# Patient Record
Sex: MALE | Race: BLACK OR AFRICAN AMERICAN | Employment: OTHER | URBAN - METROPOLITAN AREA
[De-identification: names, ages, dates, MRNs, and addresses within clinical notes are randomized per-mention and may not be internally consistent; named-entity substitution may affect disease eponyms.]

---

## 2017-08-02 PROBLEM — R60.0 LOCALIZED EDEMA: Status: ACTIVE | Noted: 2017-08-02

## 2017-10-12 PROBLEM — R60.0 LOCALIZED EDEMA: Status: RESOLVED | Noted: 2017-08-02 | Resolved: 2017-10-12

## 2018-04-19 ENCOUNTER — HOSPITAL ENCOUNTER (OUTPATIENT)
Dept: ULTRASOUND IMAGING | Age: 63
Discharge: HOME | End: 2018-04-19
Attending: INTERNAL MEDICINE
Payer: MEDICARE

## 2018-04-19 DIAGNOSIS — R09.89 RIGHT CAROTID BRUIT: ICD-10-CM

## 2018-04-19 PROCEDURE — 93880 EXTRACRANIAL BILAT STUDY: CPT

## 2018-05-07 ENCOUNTER — HOSPITAL ENCOUNTER (OUTPATIENT)
Dept: CT IMAGING | Age: 63
Discharge: HOME | End: 2018-05-07
Attending: INTERNAL MEDICINE
Payer: MEDICARE

## 2018-05-07 DIAGNOSIS — Z91.89 CARDIOVASCULAR RISK FACTOR: ICD-10-CM

## 2018-05-07 PROCEDURE — 75571 CT HRT W/O DYE W/CA TEST: CPT

## 2018-05-07 NOTE — PROGRESS NOTES
He does have some plaque in his coronaries-score is 154-above 100 is significant. Does he report any SOB or chest pain or exertional fatigue-if so I will schedule chemical stress test?  See what he says?

## 2018-05-08 NOTE — PROGRESS NOTES
Spoke to patient, detailed message was relayed and understanding expressed. Patient denies SOB, chest pain or exertional fatigue.

## 2018-09-26 ENCOUNTER — APPOINTMENT (OUTPATIENT)
Dept: PHYSICAL THERAPY | Age: 63
End: 2018-09-26

## 2018-10-02 ENCOUNTER — HOSPITAL ENCOUNTER (OUTPATIENT)
Dept: PHYSICAL THERAPY | Age: 63
Discharge: HOME | End: 2018-10-02
Payer: MEDICARE

## 2018-10-02 PROCEDURE — G8990 OTHER PT/OT CURRENT STATUS: HCPCS

## 2018-10-02 PROCEDURE — 97140 MANUAL THERAPY 1/> REGIONS: CPT

## 2018-10-02 PROCEDURE — 97166 OT EVAL MOD COMPLEX 45 MIN: CPT

## 2018-10-02 PROCEDURE — G8991 OTHER PT/OT GOAL STATUS: HCPCS

## 2018-10-02 NOTE — PROGRESS NOTES
Ambulatory/Rehab Services H2 Model Falls Risk Assessment    Risk Factor Pts. ·   Confusion/Disorientation/Impulsivity  []    4 ·   Symptomatic Depression  []   2 ·   Altered Elimination  []   1 ·   Dizziness/Vertigo  []   1 ·   Gender (Male)  [x]   1 ·   Any administered antiepileptics (anticonvulsants):  []   2 ·   Any administered benzodiazepines:  []   1 ·   Visual Impairment (specify):  []   1 ·   Portable Oxygen Use  []   1 ·   Orthostatic ? BP  []   1 ·   History of Recent Falls (within 3 mos.)  [x]   5     Ability to Rise from Chair (choose one) Pts. ·   Ability to rise in a single movement  []   0 ·   Pushes up, successful in one attempt  [x]   1 ·   Multiple attempts, but successful  []   3 ·   Unable to rise without assistance  []   4   Total: (5 or greater = High Risk) 7     Falls Prevention Plan:   []                Physical Limitations to Exercise (specify):   [x]                Mobility Assistance Device (type): NBQC   []                Exercise/Equipment Adaptation (specify):    ©2010 [facility] [address] [place] [address] Patent #8,[id number].  Federal Law prohibits the replication, distribution or use without written permission from [facility] [employer]
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
## 2018-10-16 ENCOUNTER — HOSPITAL ENCOUNTER (OUTPATIENT)
Dept: PHYSICAL THERAPY | Age: 63
Discharge: HOME | End: 2018-10-16
Payer: MEDICARE

## 2018-10-16 PROCEDURE — 97140 MANUAL THERAPY 1/> REGIONS: CPT
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
## 2018-10-22 PROBLEM — I89.0 LYMPHEDEMA OF LIMB: Status: ACTIVE | Noted: 2018-10-22

## 2018-10-23 ENCOUNTER — HOSPITAL ENCOUNTER (OUTPATIENT)
Dept: PHYSICAL THERAPY | Age: 63
Discharge: HOME | End: 2018-10-23
Payer: MEDICARE

## 2018-10-23 PROCEDURE — 97140 MANUAL THERAPY 1/> REGIONS: CPT
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
## 2019-02-21 PROBLEM — I89.0 LYMPHEDEMA OF LIMB: Status: RESOLVED | Noted: 2018-10-22 | Resolved: 2019-02-21

## 2021-05-06 PROBLEM — N18.31 STAGE 3A CHRONIC KIDNEY DISEASE (HCC): Status: ACTIVE | Noted: 2021-05-06

## 2021-05-06 PROBLEM — E78.2 MIXED HYPERLIPIDEMIA: Status: ACTIVE | Noted: 2021-05-06

## 2022-02-24 PROBLEM — R33.9 URINARY RETENTION: Status: ACTIVE | Noted: 2022-02-24

## 2022-03-18 PROBLEM — R33.9 URINARY RETENTION: Status: ACTIVE | Noted: 2022-02-24

## 2022-03-19 PROBLEM — E78.2 MIXED HYPERLIPIDEMIA: Status: ACTIVE | Noted: 2021-05-06

## 2022-03-19 PROBLEM — N18.31 STAGE 3A CHRONIC KIDNEY DISEASE (HCC): Status: ACTIVE | Noted: 2021-05-06

## 2022-06-15 ENCOUNTER — OFFICE VISIT (OUTPATIENT)
Dept: UROLOGY | Age: 67
End: 2022-06-15
Payer: MEDICARE

## 2022-06-15 DIAGNOSIS — R33.9 URINARY RETENTION: Primary | ICD-10-CM

## 2022-06-15 DIAGNOSIS — N40.1 BPH WITH OBSTRUCTION/LOWER URINARY TRACT SYMPTOMS: ICD-10-CM

## 2022-06-15 DIAGNOSIS — N13.8 BPH WITH OBSTRUCTION/LOWER URINARY TRACT SYMPTOMS: ICD-10-CM

## 2022-06-15 LAB
BILIRUBIN, URINE, POC: NEGATIVE
BLOOD URINE, POC: NORMAL
GLUCOSE URINE, POC: NEGATIVE
KETONES, URINE, POC: NEGATIVE
LEUKOCYTE ESTERASE, URINE, POC: NEGATIVE
NITRITE, URINE, POC: NEGATIVE
PH, URINE, POC: 5.5 (ref 4.6–8)
PROTEIN,URINE, POC: 30
PVR, POC: 346 CC
SPECIFIC GRAVITY, URINE, POC: 1.02 (ref 1–1.03)
URINALYSIS CLARITY, POC: NORMAL
URINALYSIS COLOR, POC: NORMAL
UROBILINOGEN, POC: NORMAL

## 2022-06-15 PROCEDURE — G8417 CALC BMI ABV UP PARAM F/U: HCPCS | Performed by: NURSE PRACTITIONER

## 2022-06-15 PROCEDURE — 3017F COLORECTAL CA SCREEN DOC REV: CPT | Performed by: NURSE PRACTITIONER

## 2022-06-15 PROCEDURE — 51798 US URINE CAPACITY MEASURE: CPT | Performed by: NURSE PRACTITIONER

## 2022-06-15 PROCEDURE — 1036F TOBACCO NON-USER: CPT | Performed by: NURSE PRACTITIONER

## 2022-06-15 PROCEDURE — 81003 URINALYSIS AUTO W/O SCOPE: CPT | Performed by: NURSE PRACTITIONER

## 2022-06-15 PROCEDURE — G8427 DOCREV CUR MEDS BY ELIG CLIN: HCPCS | Performed by: NURSE PRACTITIONER

## 2022-06-15 PROCEDURE — 99214 OFFICE O/P EST MOD 30 MIN: CPT | Performed by: NURSE PRACTITIONER

## 2022-06-15 PROCEDURE — 1123F ACP DISCUSS/DSCN MKR DOCD: CPT | Performed by: NURSE PRACTITIONER

## 2022-06-15 RX ORDER — TAMSULOSIN HYDROCHLORIDE 0.4 MG/1
0.4 CAPSULE ORAL NIGHTLY
Qty: 90 CAPSULE | Refills: 3 | Status: SHIPPED | OUTPATIENT
Start: 2022-06-15

## 2022-06-15 ASSESSMENT — ENCOUNTER SYMPTOMS
VOMITING: 0
BACK PAIN: 0
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
## 2022-06-20 ENCOUNTER — OFFICE VISIT (OUTPATIENT)
Dept: INTERNAL MEDICINE CLINIC | Facility: CLINIC | Age: 67
End: 2022-06-20
Payer: MEDICARE

## 2022-06-20 VITALS
BODY MASS INDEX: 34.02 KG/M2 | HEART RATE: 71 BPM | WEIGHT: 243 LBS | DIASTOLIC BLOOD PRESSURE: 94 MMHG | SYSTOLIC BLOOD PRESSURE: 143 MMHG | TEMPERATURE: 98.4 F | HEIGHT: 71 IN | RESPIRATION RATE: 16 BRPM | OXYGEN SATURATION: 98 %

## 2022-06-20 DIAGNOSIS — R60.0 BILATERAL LEG EDEMA: ICD-10-CM

## 2022-06-20 DIAGNOSIS — E78.2 MIXED HYPERLIPIDEMIA: ICD-10-CM

## 2022-06-20 DIAGNOSIS — N18.31 STAGE 3A CHRONIC KIDNEY DISEASE (HCC): ICD-10-CM

## 2022-06-20 DIAGNOSIS — I89.0 LYMPHEDEMA OF RIGHT LOWER EXTREMITY: ICD-10-CM

## 2022-06-20 DIAGNOSIS — I10 ESSENTIAL HYPERTENSION: Primary | ICD-10-CM

## 2022-06-20 LAB
ALBUMIN SERPL-MCNC: 3.7 G/DL (ref 3.2–4.6)
ALBUMIN/GLOB SERPL: 0.8 {RATIO} (ref 1.2–3.5)
ALP SERPL-CCNC: 94 U/L (ref 50–136)
ALT SERPL-CCNC: 23 U/L (ref 12–65)
ANION GAP SERPL CALC-SCNC: 5 MMOL/L (ref 7–16)
AST SERPL-CCNC: 18 U/L (ref 15–37)
BASOPHILS # BLD: 0 K/UL (ref 0–0.2)
BASOPHILS NFR BLD: 0 % (ref 0–2)
BILIRUB SERPL-MCNC: 0.6 MG/DL (ref 0.2–1.1)
BUN SERPL-MCNC: 15 MG/DL (ref 8–23)
CALCIUM SERPL-MCNC: 9.1 MG/DL (ref 8.3–10.4)
CHLORIDE SERPL-SCNC: 106 MMOL/L (ref 98–107)
CHOLEST SERPL-MCNC: 176 MG/DL
CO2 SERPL-SCNC: 28 MMOL/L (ref 21–32)
CREAT SERPL-MCNC: 1.1 MG/DL (ref 0.8–1.5)
DIFFERENTIAL METHOD BLD: NORMAL
EOSINOPHIL # BLD: 0.1 K/UL (ref 0–0.8)
EOSINOPHIL NFR BLD: 2 % (ref 0.5–7.8)
ERYTHROCYTE [DISTWIDTH] IN BLOOD BY AUTOMATED COUNT: 13.6 % (ref 11.9–14.6)
GLOBULIN SER CALC-MCNC: 4.5 G/DL (ref 2.3–3.5)
GLUCOSE SERPL-MCNC: 79 MG/DL (ref 65–100)
HCT VFR BLD AUTO: 41.7 % (ref 41.1–50.3)
HDLC SERPL-MCNC: 38 MG/DL (ref 40–60)
HDLC SERPL: 4.6 {RATIO}
HGB BLD-MCNC: 13.9 G/DL (ref 13.6–17.2)
IMM GRANULOCYTES # BLD AUTO: 0 K/UL (ref 0–0.5)
IMM GRANULOCYTES NFR BLD AUTO: 0 % (ref 0–5)
LDLC SERPL CALC-MCNC: 113.2 MG/DL
LYMPHOCYTES # BLD: 2.6 K/UL (ref 0.5–4.6)
LYMPHOCYTES NFR BLD: 38 % (ref 13–44)
MCH RBC QN AUTO: 30.4 PG (ref 26.1–32.9)
MCHC RBC AUTO-ENTMCNC: 33.3 G/DL (ref 31.4–35)
MCV RBC AUTO: 91.2 FL (ref 79.6–97.8)
MONOCYTES # BLD: 0.5 K/UL (ref 0.1–1.3)
MONOCYTES NFR BLD: 7 % (ref 4–12)
NEUTS SEG # BLD: 3.6 K/UL (ref 1.7–8.2)
NEUTS SEG NFR BLD: 53 % (ref 43–78)
NRBC # BLD: 0 K/UL (ref 0–0.2)
PLATELET # BLD AUTO: 178 K/UL (ref 150–450)
PMV BLD AUTO: 10.7 FL (ref 9.4–12.3)
POTASSIUM SERPL-SCNC: 4.3 MMOL/L (ref 3.5–5.1)
PROT SERPL-MCNC: 8.2 G/DL (ref 6.3–8.2)
RBC # BLD AUTO: 4.57 M/UL (ref 4.23–5.6)
SODIUM SERPL-SCNC: 139 MMOL/L (ref 136–145)
TRIGL SERPL-MCNC: 124 MG/DL (ref 35–150)
VLDLC SERPL CALC-MCNC: 24.8 MG/DL (ref 6–23)
WBC # BLD AUTO: 6.9 K/UL (ref 4.3–11.1)

## 2022-06-20 PROCEDURE — G8427 DOCREV CUR MEDS BY ELIG CLIN: HCPCS | Performed by: NURSE PRACTITIONER

## 2022-06-20 PROCEDURE — 3017F COLORECTAL CA SCREEN DOC REV: CPT | Performed by: NURSE PRACTITIONER

## 2022-06-20 PROCEDURE — 1123F ACP DISCUSS/DSCN MKR DOCD: CPT | Performed by: NURSE PRACTITIONER

## 2022-06-20 PROCEDURE — 1036F TOBACCO NON-USER: CPT | Performed by: NURSE PRACTITIONER

## 2022-06-20 PROCEDURE — G8417 CALC BMI ABV UP PARAM F/U: HCPCS | Performed by: NURSE PRACTITIONER

## 2022-06-20 PROCEDURE — 99214 OFFICE O/P EST MOD 30 MIN: CPT | Performed by: NURSE PRACTITIONER

## 2022-06-20 RX ORDER — ATORVASTATIN CALCIUM 40 MG/1
40 TABLET, FILM COATED ORAL DAILY
Qty: 90 TABLET | Refills: 3 | Status: SHIPPED | OUTPATIENT
Start: 2022-06-20

## 2022-06-20 RX ORDER — AMLODIPINE BESYLATE 5 MG/1
5 TABLET ORAL DAILY
Qty: 90 TABLET | Refills: 3 | Status: SHIPPED | OUTPATIENT
Start: 2022-06-20

## 2022-06-20 RX ORDER — ATORVASTATIN CALCIUM 40 MG/1
40 TABLET, FILM COATED ORAL DAILY
Qty: 90 TABLET | Refills: 3 | Status: SHIPPED | OUTPATIENT
Start: 2022-06-20 | End: 2022-06-20 | Stop reason: SDUPTHER

## 2022-06-20 RX ORDER — AMLODIPINE BESYLATE 5 MG/1
5 TABLET ORAL DAILY
Qty: 90 TABLET | Refills: 3 | Status: SHIPPED | OUTPATIENT
Start: 2022-06-20 | End: 2022-06-20 | Stop reason: SDUPTHER

## 2022-06-20 ASSESSMENT — ENCOUNTER SYMPTOMS
DIARRHEA: 0
SHORTNESS OF BREATH: 0
NAUSEA: 0
RHINORRHEA: 0
EYE PAIN: 0
CONSTIPATION: 0
ABDOMINAL PAIN: 0
SORE THROAT: 0
SINUS PAIN: 0
BACK PAIN: 0
VOMITING: 0
COUGH: 0

## 2022-06-20 ASSESSMENT — PATIENT HEALTH QUESTIONNAIRE - PHQ9
SUM OF ALL RESPONSES TO PHQ QUESTIONS 1-9: 0
1. LITTLE INTEREST OR PLEASURE IN DOING THINGS: 0
SUM OF ALL RESPONSES TO PHQ QUESTIONS 1-9: 0
SUM OF ALL RESPONSES TO PHQ QUESTIONS 1-9: 0
2. FEELING DOWN, DEPRESSED OR HOPELESS: 0
SUM OF ALL RESPONSES TO PHQ QUESTIONS 1-9: 0
SUM OF ALL RESPONSES TO PHQ9 QUESTIONS 1 & 2: 0
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
## 2022-06-30 ENCOUNTER — TELEPHONE (OUTPATIENT)
Dept: INTERNAL MEDICINE CLINIC | Facility: CLINIC | Age: 67
End: 2022-06-30

## 2022-06-30 NOTE — TELEPHONE ENCOUNTER
Patient mailed some paperwork to the office. It seems to be something for getting disability. It's not clear. They are in the binder at the .

## 2022-07-08 ENCOUNTER — HOSPITAL ENCOUNTER (OUTPATIENT)
Dept: PHYSICAL THERAPY | Age: 67
Setting detail: RECURRING SERIES
Discharge: HOME | End: 2022-07-11
Payer: MEDICARE

## 2022-07-08 PROCEDURE — 97535 SELF CARE MNGMENT TRAINING: CPT

## 2022-07-08 PROCEDURE — 97166 OT EVAL MOD COMPLEX 45 MIN: CPT

## 2022-07-08 ASSESSMENT — PAIN SCALES - GENERAL: PAINLEVEL_OUTOF10: 0

## 2022-07-08 NOTE — PROGRESS NOTES
Sukhwinder Bartlett  : 1955  Primary: Medicare Part A And B  Secondary: Franklyn Patel @ [address] [place] [address] Road [address] [place] [address]  Phone: [phone]  Fax: [phone] Plan Frequency: 2x a week    Certification Period Expiration Date: 10/06/22      OT Visit Info: Total # of Visits to Date: 1      OUTPATIENT OCCUPATIONAL THERAPY: Treatment Note 2022  Appt Desk   Episode      Treatment Diagnosis:  See evaluation  Medical/Referring Diagnosis:  Lymphedema, not elsewhere classified [I89.0]  Referring Physician:  DELFINO Mondragon MD Orders:  OT Eval and Treat   Date of Onset:  Onset Date: 12     Allergies:  Patient has no known allergies. Restrictions/Precautions:    No data recordedNo data recorded  Medications Last Reviewed:  2022     Interventions Planned: (Treatment may consist of any combination of the following)  Current Treatment Recommendations: Strengthening; ROM; Functional mobility training; Self-Care / ADL; Manual Therapy:  MLD; Equipment evaluation, education, & procurement; Patient/Caregiver education & training;  Safety education & training     Subjective Comments: Get my legs less stiff     Initial:     0/10 Post Session:     0/10  Medications Last Reviewed:  2022  Updated Objective Findings:  See evaluation note from today  Treatment     MANUAL THERAPY (   ) Minutues:  Manual Lymph Drainage:   Lymph Nodes:    Cervical Supraclavicular Axillary Abdominal Inguinal Popliteal Antecubital   RIGHT []     []     []     []     []     []     []       LEFT []     []     []     []     []     []     []         Anastamoses:   Axillo-axillary Inguino-inguinal Axillo-inguinal Inguino-axillary   ANTERIOR []     []     []     []       POSTERIOR []     []     []     []       RIGHT []     []     []     []       LEFT []     []     []     []         Limbs:   []    RUE     []    LUE     []    RLE    []    LLE      SELF CARE: (20 minutes):   Eucerin to bilateral legs and educated on lotion to use at home. 4\" biagrip used on bilateral legs to below knee. 1/2 grey foam only used on right ankle. 2 short stretch bandages used this date for first time compression. Educated to remove if painful, dizzy, or shortness of breathe. Treatment/Session Summary:    · Treatment Assessment:  See evaluation note     · Communication/Consultation:  Seeeval  · Equipment provided today:  2 short stretch bandage, grey foam, 2 biagrip compression sleeves. · Recommendations/Intent for next treatment session: Next visit will focus on phase 1 complete decongestive therapy.     Total Treatment Billable Duration: 20 minutes  OT Individual Minutes  Time In: 1200  Time Out: 1300  Minutes: [address] [place] [address], OT    Post Session Pain  Charge Capture   POC Link  Treatment Note Link  MD Guidelines  MyChart    Future Appointments   Date Time Provider Brooke Acevedo   7/12/2022  1:00 PM Lance Couch, OT [facility]   7/15/2022 10:00 AM Lance Couch, OT [facility]   7/19/2022  1:00 PM Clemike Couch, OT [facility]   7/22/2022 10:00 AM Leanna Maravilla, OT [facility]   7/26/2022  1:00 PM Cleophas Iza, OT [facility]   7/29/2022 10:00 AM Lance Couch, OT [facility]   9/19/2022  1:20 PM Dawood Vigil, APRN - CNP [id number] [facility]   9/20/2022  3:15 PM Cosmo Tierney APRN - CNP [id number]
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
## 2022-07-12 ENCOUNTER — APPOINTMENT (OUTPATIENT)
Dept: PHYSICAL THERAPY | Age: 67
End: 2022-07-12
Payer: MEDICARE

## 2022-07-13 ENCOUNTER — APPOINTMENT (OUTPATIENT)
Dept: PHYSICAL THERAPY | Age: 67
End: 2022-07-13
Payer: MEDICARE

## 2022-07-14 ENCOUNTER — HOSPITAL ENCOUNTER (OUTPATIENT)
Dept: PHYSICAL THERAPY | Age: 67
Setting detail: RECURRING SERIES
Discharge: HOME | End: 2022-07-17
Payer: MEDICARE

## 2022-07-14 PROCEDURE — 97535 SELF CARE MNGMENT TRAINING: CPT

## 2022-07-14 PROCEDURE — 97140 MANUAL THERAPY 1/> REGIONS: CPT

## 2022-07-14 NOTE — PROGRESS NOTES
Viviane You  : 1955  Primary: Medicare Part A And B  Secondary: Franklyn Patel @ [address] [place] [address] Road [facility] [facility] [place] [address]  Phone: [phone]  Fax: [phone] Plan Frequency: 2x a week    Certification Period Expiration Date: 10/06/22      OT Visit Info: Total # of Visits to Date: 1      OUTPATIENT OCCUPATIONAL THERAPY: Treatment Note 2022  Appt Desk   Episode      Treatment Diagnosis:  See evaluation  Medical/Referring Diagnosis:  Lymphedema, not elsewhere classified [I89.0]  Referring Physician:  DELFINO Sotelo MD Orders:  OT Eval and Treat   Date of Onset:  Onset Date: 12     Allergies:  Patient has no known allergies. Restrictions/Precautions:    No data recordedNo data recorded  Medications Last Reviewed:  2022     Interventions Planned: (Treatment may consist of any combination of the following)  Current Treatment Recommendations: Strengthening; ROM; Functional mobility training; Self-Care / ADL; Manual Therapy:  MLD; Equipment evaluation, education, & procurement; Patient/Caregiver education & training; Safety education & training     Subjective Comments: I will get some lotion Saturday. Can we use that pump? Initial:      /10 Post Session:      /10  Medications Last Reviewed:  2022  Updated Objective Findings:  See evaluation note from today  Treatment     MANUAL THERAPY (  25 ) Minutues: Used MLD for BLE's.   Manual Lymph Drainage:   Lymph Nodes:    Cervical Supraclavicular Axillary Abdominal Inguinal Popliteal Antecubital   RIGHT [x]     []     []     [x]     [x]     [x]     []       LEFT [x]     []     []     [x]     [x]     [x]     []         Anastamoses:   Axillo-axillary Inguino-inguinal Axillo-inguinal Inguino-axillary   ANTERIOR []     []     []     []       POSTERIOR []     []     []     []       RIGHT []     []     []     []       LEFT []     []     []     []         Limbs:   []    RUE []    LUE     [x]    RLE    [x]    LLE      SELF CARE: (30 minutes):   Eucerin to bilateral legs and educated on lotion to use at home. 4\" biagrip used on bilateral legs to below knee. 1/2 grey foam only used on right ankle. 3 short stretch bandages used this date. Educated to remove if painful, dizzy, or shortness of breath. Treatment/Session Summary:    · Treatment Assessment:  Patient missed a few sessions due to transportation, but does well when able to keep schedule. · Communication/Consultation:    · Equipment provided today:  3 short stretch bandage, 2 biagrip compression sleeves. · Recommendations/Intent for next treatment session: Next visit will continue on phase 1 complete decongestive therapy.     Total Treatment Billable Duration: 55 minutes  OT Individual Minutes  Time In: 9337  Time Out: 1000  Minutes: [address] [place], [place]    Post Session Pain  Charge Capture   POC Link  Treatment Note Link  MD Guidelines  MyChart    Future Appointments   Date Time Provider Brooke Acevedo   7/15/2022 10:00 AM Sheldon Mata, OT [facility]   7/19/2022  1:00 PM Sheldon Mata, OT [facility]   7/22/2022 10:00 AM Judi Toeny, OT [facility]   7/26/2022  1:00 PM Wandae s, OT [facility]   7/29/2022 10:00 AM Sheldon Mata, OT [facility]   9/19/2022  1:20 PM Watson Esquivel APRN - CNP [address] [facility]   9/20/2022  3:15 PM DELFINO Multani CNP [id number] [facility]

## 2022-07-15 ENCOUNTER — HOSPITAL ENCOUNTER (OUTPATIENT)
Dept: PHYSICAL THERAPY | Age: 67
Setting detail: RECURRING SERIES
Discharge: HOME | End: 2022-07-18
Payer: MEDICARE

## 2022-07-15 PROCEDURE — 97140 MANUAL THERAPY 1/> REGIONS: CPT

## 2022-07-15 PROCEDURE — 97535 SELF CARE MNGMENT TRAINING: CPT

## 2022-07-15 NOTE — PROGRESS NOTES
Amalia Ellison  : 1955  Primary: Medicare Part A And B  Secondary: Franklyn Patel @ [address] [place] [address] Road [facility] [address]  Phone: [phone]  Fax: [phone] Plan Frequency: 2x a week    Certification Period Expiration Date: 10/06/22      OT Visit Info: Total # of Visits to Date: 1      OUTPATIENT OCCUPATIONAL THERAPY: Treatment Note 7/15/2022  Appt Desk   Episode      Treatment Diagnosis:  See evaluation  Medical/Referring Diagnosis:  Lymphedema, not elsewhere classified [I89.0]  Referring Physician:  DELFINO Perez MD Orders:  OT Eval and Treat   Date of Onset:  Onset Date: 12     Allergies:  Patient has no known allergies. Restrictions/Precautions:    No data recordedNo data recorded  Medications Last Reviewed:  7/15/2022     Interventions Planned: (Treatment may consist of any combination of the following)  Current Treatment Recommendations: Strengthening; ROM; Functional mobility training; Self-Care / ADL; Manual Therapy:  MLD; Equipment evaluation, education, & procurement; Patient/Caregiver education & training; Safety education & training     Subjective Comments: Saturday Ill go to [facility] for lotion. Initial:      /10 Post Session:      /10  Medications Last Reviewed:  7/15/2022  Updated Objective Findings:  See evaluation note from today  Treatment     MANUAL THERAPY (  25 ) Minutues: Used MLD for BLE's.   Manual Lymph Drainage:   Lymph Nodes:    Cervical Supraclavicular Axillary Abdominal Inguinal Popliteal Antecubital   RIGHT [x]     []     []     [x]     [x]     [x]     []       LEFT [x]     []     []     [x]     [x]     [x]     []         Anastamoses:   Axillo-axillary Inguino-inguinal Axillo-inguinal Inguino-axillary   ANTERIOR []     []     []     []       POSTERIOR []     []     []     []       RIGHT []     []     []     []       LEFT []     []     []     []         Limbs:   []    RUE     []    LUE [x]    RLE    [x]    LLE      SELF CARE: (30 minutes):   Eucerin to bilateral legs and educated on lotion to use at home. 4\" biagrip used on bilateral legs to below knee. 1/2 grey foam only used on right ankle. 3 short stretch bandages used this date. Educated to remove if painful, dizzy, or shortness of breath. Treatment/Session Summary:    Treatment Assessment:  Really good reduction. Communication/Consultation:    Equipment provided today:  3 short stretch bandage, 2 biagrip compression sleeves. His bandages were washed and rolled but not dried. Recommendations/Intent for next treatment session: Next visit will continue on phase 1 complete decongestive therapy.     Total Treatment Billable Duration: 60 minutes  OT Individual Minutes  Time In: 1000  Time Out: 1100  Minutes: [address] [place] [address]    Post Session Pain  Charge Capture   POC Link  Treatment Note Link  MD Guidelines  MyChart    Future Appointments   Date Time Provider Brooke Acevedo   7/19/2022  1:00 PM Ana Tinsley OT [facility]   7/22/2022 10:00 AM Pita Ivan OT [facility]   7/26/2022  1:00 PM Ana Tinsley OT [facility]   7/29/2022 10:00 AM Ana Tinsley, OT [facility]   9/19/2022  1:20 PM DELFINO Bowens - CNP LW [facility]   9/20/2022  3:15 PM DELFINO Montiel - CNP [id number] [facility]

## 2022-07-19 ENCOUNTER — HOSPITAL ENCOUNTER (OUTPATIENT)
Dept: PHYSICAL THERAPY | Age: 67
Setting detail: RECURRING SERIES
Discharge: HOME | End: 2022-07-22
Payer: MEDICARE

## 2022-07-19 PROCEDURE — 97535 SELF CARE MNGMENT TRAINING: CPT

## 2022-07-19 PROCEDURE — 97140 MANUAL THERAPY 1/> REGIONS: CPT

## 2022-07-19 NOTE — PROGRESS NOTES
Anat Britton  : 1955  Primary: Medicare Part A And B  Secondary: Franklyn Patel @ [address] [place] [address] [address] [facility] [address]  Phone: [phone]  Fax: [phone] Plan Frequency: 2x a week    Certification Period Expiration Date: 10/06/22      OT Visit Info: Total # of Visits to Date: 3      OUTPATIENT OCCUPATIONAL THERAPY: Treatment Note 2022  Appt Desk   Episode      Treatment Diagnosis:  See evaluation  Medical/Referring Diagnosis:  Lymphedema, not elsewhere classified [I89.0]  Referring Physician:  DELFINO Savage MD Orders:  OT Eval and Treat   Date of Onset:  Onset Date: 12     Allergies:  Patient has no known allergies. Restrictions/Precautions:    No data recordedNo data recorded  Medications Last Reviewed:  2022     Interventions Planned: (Treatment may consist of any combination of the following)  Current Treatment Recommendations: Strengthening; ROM; Functional mobility training; Self-Care / ADL; Manual Therapy:  MLD; Equipment evaluation, education, & procurement; Patient/Caregiver education & training; Safety education & training     Subjective Comments: I was able to get that lotion. Initial:      /10 Post Session:      /10  Medications Last Reviewed:  2022  Updated Objective Findings:  See evaluation note from today  Treatment     MANUAL THERAPY (  30 ) Minutues: Used MLD for BLE's.   Manual Lymph Drainage:   Lymph Nodes:    Cervical Supraclavicular Axillary Abdominal Inguinal Popliteal Antecubital   RIGHT [x]     []     []     [x]     [x]     [x]     []       LEFT [x]     []     []     [x]     [x]     [x]     []         Anastamoses:   Axillo-axillary Inguino-inguinal Axillo-inguinal Inguino-axillary   ANTERIOR []     []     []     []       POSTERIOR []     []     []     []       RIGHT []     []     []     []       LEFT []     []     []     []         Limbs:   []    RUE     []    LUE     [x] RLE    [x]    LLE      SELF CARE: (30 minutes):   Eucerin to bilateral legs and educated on lotion to use at home. 4\" biagrip used on bilateral legs to below knee. 1/2 grey foam only used on right ankle. 3 short stretch bandages used this date. Educated to remove if painful, dizzy, or shortness of breath. Treatment/Session Summary:    Treatment Assessment:  Really good reduction. Right ankle has softened to allow additional lymph drainage. Communication/Consultation:    Equipment provided today:  Patient has all material they need and brought them back. Recommendations/Intent for next treatment session: Next visit will continue on phase 1 complete decongestive therapy.     Total Treatment Billable Duration: 60 minutes  OT Individual Minutes  Time In: 1300  Time Out: 1400  Minutes: [address] [place] [address]    Post Session Pain  Charge Capture   POC Link  Treatment Note Link  MD Guidelines  MyChart    Future Appointments   Date Time Provider Brooke Acevedo   7/22/2022 10:00 AM Correne Cushing, OT [facility]   7/26/2022  1:00 PM Dale Ego, OT [facility]   7/29/2022 10:00 AM [place] Ego, OT [facility]   9/19/2022  1:20 PM DELFINO Shrestha - CNP [id number]   9/20/2022  3:15 PM DELFINO Morales - CNP [id number] [facility]

## 2022-07-22 ENCOUNTER — HOSPITAL ENCOUNTER (OUTPATIENT)
Dept: PHYSICAL THERAPY | Age: 67
Setting detail: RECURRING SERIES
Discharge: HOME | End: 2022-07-25
Payer: MEDICARE

## 2022-07-22 PROCEDURE — 97140 MANUAL THERAPY 1/> REGIONS: CPT

## 2022-07-22 PROCEDURE — 97535 SELF CARE MNGMENT TRAINING: CPT

## 2022-07-22 ASSESSMENT — PAIN SCALES - GENERAL: PAINLEVEL_OUTOF10: 2

## 2022-07-22 NOTE — PROGRESS NOTES
Raphael Martinez  : 1955  Primary: Medicare Part A And B  Secondary: Franklyn Patel @ [address] [place] [address] [address] [facility] [address]  Phone: [phone]  Fax: [phone] Plan Frequency: 2x a week    Certification Period Expiration Date: 10/06/22      OT Visit Info: Total # of Visits to Date: 6      OUTPATIENT OCCUPATIONAL THERAPY: Treatment Note 2022  Appt Desk   Episode      Treatment Diagnosis:  See evaluation  Medical/Referring Diagnosis:  Lymphedema, not elsewhere classified [I89.0]  Referring Physician:  DELFINO Felton MD Orders:  OT Eval and Treat   Date of Onset:  Onset Date: 12     Allergies:  Patient has no known allergies. Restrictions/Precautions:    No data recordedNo data recorded  Medications Last Reviewed:  2022     Interventions Planned: (Treatment may consist of any combination of the following)  Current Treatment Recommendations: Strengthening; ROM; Functional mobility training; Self-Care / ADL; Manual Therapy:  MLD; Equipment evaluation, education, & procurement; Patient/Caregiver education & training; Safety education & training     Subjective Comments: \"My legs feel pretty good\"     Initial:     210 Post Session:     0/10  Medications Last Reviewed:  2022  Updated Objective Findings:  None today  Treatment     MANUAL THERAPY (  30 minutes )  MLD bilateral legs.     Manual Lymph Drainage:   Lymph Nodes:    Cervical Supraclavicular Axillary Abdominal Inguinal Popliteal Antecubital   RIGHT [x]     []     []     [x]     [x]     [x]     []       LEFT [x]     []     []     [x]     [x]     [x]     []         Anastamoses:   Axillo-axillary Inguino-inguinal Axillo-inguinal Inguino-axillary   ANTERIOR []     []     []     []       POSTERIOR []     []     []     []       RIGHT []     []     []     []       LEFT []     []     []     []         Limbs:   []    RUE     []    LUE     [x]    RLE    [x] LLE      SELF CARE: (30 minutes):   Eucerin to bilateral legs and educated on lotion to use at home. 4\" biagrip used on bilateral legs to below knee. 1/2\" grey foam only used on right ankle. 3 short stretch bandages used this date. Educated to remove if painful, dizzy, or shortness of breath. Treatment/Session Summary:    Treatment Assessment:  Really good reduction. Right ankle has softened to allow additional lymph drainage. Pt pleased with progress to date. Good compliance. Communication/Consultation:  None today  Equipment provided today:  Patient has all material they need and brought them back. Recommendations/Intent for next treatment session: Next visit will continue on phase 1 complete decongestive therapy.     Total Treatment Billable Duration: 60 minutes  OT Individual Minutes  Time In: 1000  Time Out: 1100  Minutes: [address], OT    Post Session Pain  Charge Capture   POC Link  Treatment Note Link  MD Guidelines  MyChart    Future Appointments   Date Time Provider Brooke Acevedo   7/26/2022  1:00 PM Carmella Jose OT [facility]   7/29/2022 10:00 [facility]   9/19/2022  1:20 PM Sammie Hawley, APRN - CNP [id number]   9/20/2022  3:15 PM Deandre Ching, DELFINO - CNP [id number] [facility]

## 2022-07-26 ENCOUNTER — APPOINTMENT (OUTPATIENT)
Dept: PHYSICAL THERAPY | Age: 67
End: 2022-07-26
Payer: MEDICARE

## 2022-07-29 ENCOUNTER — HOSPITAL ENCOUNTER (OUTPATIENT)
Dept: PHYSICAL THERAPY | Age: 67
Setting detail: RECURRING SERIES
Discharge: HOME | End: 2022-08-01
Payer: MEDICARE

## 2022-07-29 PROCEDURE — 97140 MANUAL THERAPY 1/> REGIONS: CPT

## 2022-07-29 PROCEDURE — 97535 SELF CARE MNGMENT TRAINING: CPT

## 2022-07-29 NOTE — PROGRESS NOTES
Marly Estes  : 1955  Primary: Medicare Part A And B  Secondary: Franklyn Patel @ [address] [place] [address] Road [address] [place] [address] [address] [place] [address]  Phone: [phone]  Fax: [phone] Plan Frequency: 2x a week    Certification Period Expiration Date: 10/06/22      OT Visit Info: Total # of Visits to Date: 6      OUTPATIENT OCCUPATIONAL THERAPY: Treatment Note 2022  Appt Desk   Episode      Treatment Diagnosis:  See evaluation  Medical/Referring Diagnosis:  Lymphedema, not elsewhere classified [I89.0]  Referring Physician:  DELFINO Westfall MD Orders:  OT Eval and Treat   Date of Onset:  Onset Date: 12     Allergies:  Patient has no known allergies. Restrictions/Precautions:    No data recordedNo data recorded  Medications Last Reviewed:  2022     Interventions Planned: (Treatment may consist of any combination of the following)  Current Treatment Recommendations: Strengthening; ROM; Functional mobility training; Self-Care / ADL; Manual Therapy:  MLD; Equipment evaluation, education, & procurement; Patient/Caregiver education & training; Safety education & training     Subjective Comments: \"My legs feel pretty good\"     Initial:      /10 Post Session:      /10  Medications Last Reviewed:  2022  Updated Objective Findings:  None today  Treatment     MANUAL THERAPY (  30 minutes )  MLD bilateral legs.     Manual Lymph Drainage:   Lymph Nodes:    Cervical Supraclavicular Axillary Abdominal Inguinal Popliteal Antecubital   RIGHT [x]     []     []     [x]     [x]     [x]     []       LEFT [x]     []     []     [x]     [x]     [x]     []         Anastamoses:   Axillo-axillary Inguino-inguinal Axillo-inguinal Inguino-axillary   ANTERIOR []     []     []     []       POSTERIOR []     []     []     []       RIGHT []     []     []     []       LEFT []     []     []     []         Limbs:   []    RUE     []    LUE     [x]    RLE    [x] LLE      SELF CARE: (30 minutes):   Eucerin to bilateral legs and educated on lotion to use at home. 4\" biagrip used on bilateral legs to below knee. Pt lost foam so we added 1/4 grey foam from ankle to knee. 3 short stretch bandages used this date. Educated to remove if painful, dizzy, or shortness of breath. Treatment/Session Summary:    Treatment Assessment:  Really good reduction. Right ankle has softened to allow additional lymph drainage. Pt pleased with progress to date. Good compliance. Communication/Consultation:  None today  Equipment provided today:  1/4 grey foam from knee to ankle. Recommendations/Intent for next treatment session: Next visit will continue on phase 1 complete decongestive therapy.     Total Treatment Billable Duration: 65 minutes  OT Individual Minutes  Time In: 4036  Time Out: 1100  Minutes: 72    Jolene Swift    Post Session Pain  Charge Capture   POC Link  Treatment Note Link  MD Guidelines  MyChart    Future Appointments   Date Time Provider Brooke Acevedo   9/19/2022  1:20 PM Monik Petty APRN - CNP [address] [facility] AMB   9/20/2022  3:15 PM DELFINO Wheeler - CNP [id number] [facility] AMB

## 2022-08-02 ENCOUNTER — HOSPITAL ENCOUNTER (OUTPATIENT)
Dept: PHYSICAL THERAPY | Age: 67
Setting detail: RECURRING SERIES
Discharge: HOME | End: 2022-08-05
Payer: MEDICARE

## 2022-08-02 PROCEDURE — 97535 SELF CARE MNGMENT TRAINING: CPT

## 2022-08-02 PROCEDURE — 97140 MANUAL THERAPY 1/> REGIONS: CPT

## 2022-08-02 NOTE — PROGRESS NOTES
Sukhwinder Bartlett  : 1955  Primary: Medicare Part A And B  Secondary: Franklyn Patel @ [address] [place] [address] Road [facility] [address] [facility] [address]  Phone: [phone]  Fax: [phone] Plan Frequency: 2x a week    Certification Period Expiration Date: 10/06/22      OT Visit Info: Total # of Visits to Date: 6      OUTPATIENT OCCUPATIONAL THERAPY: Treatment Note 2022  Appt Desk   Episode      Treatment Diagnosis:  See evaluation  Medical/Referring Diagnosis:  Lymphedema, not elsewhere classified [I89.0]  Referring Physician:  DELFINO Mondragon MD Orders:  OT Eval and Treat   Date of Onset:  Onset Date: 12     Allergies:  Patient has no known allergies. Restrictions/Precautions:    No data recordedNo data recorded  Medications Last Reviewed:  2022     Interventions Planned: (Treatment may consist of any combination of the following)  Current Treatment Recommendations: Strengthening; ROM; Functional mobility training; Self-Care / ADL; Manual Therapy:  MLD; Equipment evaluation, education, & procurement; Patient/Caregiver education & training; Safety education & training     Subjective Comments: \"My legs feel better\"     Initial:      /10 Post Session:      /10  Medications Last Reviewed:  2022  Updated Objective Findings:  None today  Treatment     MANUAL THERAPY (  30 minutes )  MLD bilateral legs.     Manual Lymph Drainage:   Lymph Nodes:    Cervical Supraclavicular Axillary Abdominal Inguinal Popliteal Antecubital   RIGHT [x]     []     []     [x]     [x]     [x]     []       LEFT [x]     []     []     [x]     [x]     [x]     []         Anastamoses:   Axillo-axillary Inguino-inguinal Axillo-inguinal Inguino-axillary   ANTERIOR []     []     []     []       POSTERIOR []     []     []     []       RIGHT []     []     []     []       LEFT []     []     []     []         Limbs:   []    RUE     []    LUE     [x]    RLE    [x]    LLE      SELF CARE: (30 minutes):   Eucerin to bilateral legs and educated on lotion to use at home. 4\" biagrip used on bilateral legs to below knee. Foam added 1/4 grey foam from ankle to knee. 3 short stretch bandages used  on each leg this date. Educated to remove if painful, dizzy, or shortness of breath. Treatment/Session Summary:    Treatment Assessment:  Really good reduction. Right ankle has softened to allow additional lymph drainage. Pt pleased with progress to date. Good compliance. Communication/Consultation:  None today  Equipment provided today:  none   Recommendations/Intent for next treatment session: Next visit will continue on phase 1 complete decongestive therapy.     Total Treatment Billable Duration: 60 minutes  OT Individual Minutes  Time In: 1400  Time Out: 1500  Minutes: [address] [place] [address], OT    Post Session Pain  Charge Capture   POC Link  Treatment Note Link  MD Guidelines  MyChart    Future Appointments   Date Time Provider Brooke Acevedo   8/4/2022 11:00 AM Jonda Pitch, OT [facility]   8/9/2022  1:00 PM Jonda Pitch, OT [facility]   8/11/2022 12:00 PM Rahel Rodriguez, OT [facility]   8/16/2022  1:00 PM Jonda Pitch, OT [facility]   8/19/2022 11:00 AM Jonda Pitch, OT [facility]   8/23/2022  1:00 PM Jonda Pitch, OT [facility]   9/19/2022  1:20 PM Meredosia Morning, APRN - CNP [address] [facility]   9/20/2022  3:15 PM Lay Hilario, APRN - CNP [id number] [facility]

## 2022-08-04 ENCOUNTER — HOSPITAL ENCOUNTER (OUTPATIENT)
Dept: PHYSICAL THERAPY | Age: 67
Setting detail: RECURRING SERIES
Discharge: HOME | End: 2022-08-07
Payer: MEDICARE

## 2022-08-04 PROCEDURE — 97140 MANUAL THERAPY 1/> REGIONS: CPT

## 2022-08-04 PROCEDURE — 97535 SELF CARE MNGMENT TRAINING: CPT

## 2022-08-04 NOTE — PROGRESS NOTES
(30 minutes):   Eucerin to bilateral legs and educated on lotion to use at home. 4\" biagrip used on bilateral legs to below knee. Foam added 1/4 grey foam from ankle to knee on right leg only. 3 short stretch bandages used  on each leg this date. Educated to remove if painful, dizzy, or shortness of breath. Treatment/Session Summary:    Treatment Assessment:  Really good reduction. Right ankle has softened to allow additional lymph drainage. Pt pleased with progress to date. Good compliance. Communication/Consultation:  None today  Equipment provided today:  none   Recommendations/Intent for next treatment session: Next visit will continue on phase 1 complete decongestive therapy.     Total Treatment Billable Duration: 60 minutes  OT Individual Minutes  Time In: 1100  Time Out: 1200  Minutes: [address] [place] [address]    Post Session Pain  Charge Capture   POC Link  Treatment Note Link  MD Guidelines  MyChart    Future Appointments   Date Time Provider Brooke Acevedo   8/9/2022  1:00 PM Darcie Barreto OT [facility]   8/11/2022 12:00 PM Gallo Snell OT [facility]   8/16/2022  1:00 PM Darcie Barreto OT [facility]   8/19/2022 11:00 AM Darcie Barreto OT [facility]   8/23/2022  1:00 PM Darcie Barreto OT [facility]   9/19/2022  1:20 PM [facility]   9/20/2022  3:15 PM DELFINO Schumacher CNP [id number] [facility]

## 2022-08-09 ENCOUNTER — APPOINTMENT (OUTPATIENT)
Dept: PHYSICAL THERAPY | Age: 67
End: 2022-08-09
Payer: MEDICARE

## 2022-08-11 ENCOUNTER — HOSPITAL ENCOUNTER (OUTPATIENT)
Dept: PHYSICAL THERAPY | Age: 67
Setting detail: RECURRING SERIES
Discharge: HOME | End: 2022-08-14
Payer: MEDICARE

## 2022-08-11 PROCEDURE — 97535 SELF CARE MNGMENT TRAINING: CPT

## 2022-08-11 PROCEDURE — 97140 MANUAL THERAPY 1/> REGIONS: CPT

## 2022-08-11 ASSESSMENT — PAIN SCALES - GENERAL: PAINLEVEL_OUTOF10: 1

## 2022-08-11 NOTE — PROGRESS NOTES
Landon Loyd  : 1955  Primary: Medicare Part A And B  Secondary: Franklyn Patel @ [address] [place] [address] Road [facility] [address]  Phone: [phone]  Fax: [phone] Plan Frequency: 2x a week    Certification Period Expiration Date: 10/06/22      OT Visit Info: Total # of Visits to Date: 6      OUTPATIENT OCCUPATIONAL THERAPY: Treatment Note 2022  Appt Desk   Episode      Treatment Diagnosis:  See evaluation  Medical/Referring Diagnosis:  Lymphedema, not elsewhere classified [I89.0]  Referring Physician:  DELFINO Villavicencio MD Orders:  OT Eval and Treat   Date of Onset:  Onset Date: 12     Allergies:  Patient has no known allergies. Restrictions/Precautions:    No data recordedNo data recorded  Medications Last Reviewed:  2022     Interventions Planned: (Treatment may consist of any combination of the following)  Current Treatment Recommendations: Strengthening; ROM; Functional mobility training; Self-Care / ADL; Manual Therapy:  MLD; Equipment evaluation, education, & procurement; Patient/Caregiver education & training; Safety education & training     Subjective Comments: No new complaints. He arrived wearing compression socks. Initial:     1/10 Post Session:     0/10  Medications Last Reviewed:  2022  Updated Objective Findings:  None today  Treatment     MANUAL THERAPY (  35 minutes )  MLD bilateral legs.     Manual Lymph Drainage:   Lymph Nodes:    Cervical Supraclavicular Axillary Abdominal Inguinal Popliteal Antecubital   RIGHT [x]     []     []     [x]     [x]     [x]     []       LEFT [x]     []     []     [x]     [x]     [x]     []         Anastamoses:   Axillo-axillary Inguino-inguinal Axillo-inguinal Inguino-axillary   ANTERIOR []     []     []     []       POSTERIOR []     []     []     []       RIGHT []     []     []     []       LEFT []     []     []     []         Limbs:   []    RUE     []    LUE [x]    RLE    [x]    LLE      SELF CARE: (25 minutes):   Eucerin to bilateral legs and educated on lotion to use at home. 4\" biagrip used on bilateral legs to below knee. Foam added 1/4 grey foam from ankle to knee on right leg only. Right toes were bandaged with mollelast. 3 short stretch bandages used  on each leg this date. Educated to remove if painful, dizzy, or shortness of breath. Treatment/Session Summary:    Treatment Assessment:  Pt is progressing towards goals. Continued OT recommended to reach all goals. Communication/Consultation:  None today  Equipment provided today:  none   Recommendations/Intent for next treatment session: Next visit will continue on phase 1 complete decongestive therapy.     Total Treatment Billable Duration: 60 minutes  OT Individual Minutes  Time In: 1200  Time Out: 1300  Minutes: 61    Mariah Renee OTR/L, CLT    Post Session Pain  Charge Capture   POC Link  Treatment Note Link  MD Guidelines  MyChart    Future Appointments   Date Time Provider Brooke Acevedo   8/16/2022  1:00 PM Jnui Buttner, OT [facility]   8/19/2022 11:00 AM Ujni Buttner, OT [facility]   8/23/2022  1:00 PM Juni Buttner, OT [facility]   9/19/2022  1:20 PM DELFINO Ruiz - CNP [facility]   9/20/2022  3:15 PM DELFINO Thomas - CNP [id number] [facility]
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
## 2022-08-16 ENCOUNTER — HOSPITAL ENCOUNTER (OUTPATIENT)
Dept: PHYSICAL THERAPY | Age: 67
Setting detail: RECURRING SERIES
Discharge: HOME | End: 2022-08-19
Payer: MEDICARE

## 2022-08-16 PROCEDURE — 97140 MANUAL THERAPY 1/> REGIONS: CPT

## 2022-08-16 PROCEDURE — 97535 SELF CARE MNGMENT TRAINING: CPT

## 2022-08-16 NOTE — PROGRESS NOTES
Petar Valenzuela  : 1955  Primary: Medicare Part A And B  Secondary: Franklyn Patel @ [address] [place] [address] [address] [facility] [address]  Phone: [phone]  Fax: [phone] Plan Frequency: 2x a week    Certification Period Expiration Date: 10/06/22      OT Visit Info: Total # of Visits to Date: 6      OUTPATIENT OCCUPATIONAL THERAPY: Treatment Note 2022  Appt Desk   Episode      Treatment Diagnosis:  See evaluation  Medical/Referring Diagnosis:  Lymphedema, not elsewhere classified [I89.0]  Referring Physician:  DELFINO Dawson MD Orders:  OT Eval and Treat   Date of Onset:  Onset Date: 12     Allergies:  Patient has no known allergies. Restrictions/Precautions:    No data recordedNo data recorded  Medications Last Reviewed:  2022     Interventions Planned: (Treatment may consist of any combination of the following)  Current Treatment Recommendations: Strengthening; ROM; Functional mobility training; Self-Care / ADL; Manual Therapy:  MLD; Equipment evaluation, education, & procurement; Patient/Caregiver education & training; Safety education & training     Subjective Comments: Left ankle remains swollen. He arrived wearing compression socks. Initial:      /10 Post Session:      /10  Medications Last Reviewed:  2022  Updated Objective Findings:  None today  Treatment     MANUAL THERAPY (  45 minutes )  MLD bilateral legs.     Manual Lymph Drainage:   Lymph Nodes:    Cervical Supraclavicular Axillary Abdominal Inguinal Popliteal Antecubital   RIGHT [x]     []     []     [x]     [x]     [x]     []       LEFT [x]     []     []     [x]     [x]     [x]     []         Anastamoses:   Axillo-axillary Inguino-inguinal Axillo-inguinal Inguino-axillary   ANTERIOR []     []     []     []       POSTERIOR []     []     []     []       RIGHT []     []     []     []       LEFT []     []     []     []         Limbs:   []    RUE []    LUE     [x]    RLE    [x]    LLE      SELF CARE: (30 minutes):   Eucerin to bilateral legs and educated on lotion to use at home. 4\" biagrip used on bilateral legs to below knee. Foam added 1/4 grey foam from ankle to knee on right leg only. Right toes were bandaged with mollelast. 3 short stretch bandages used  on each leg this date. Educated to remove if painful, dizzy, or shortness of breath. Lymphedema:  Circumference Measurements (Lower Extremity):                  Date:  7/8/222 Date:   8/16/22 Date: Date: Date:   Location Right Left Right Left Right Left Right Left Right Left   Knee   42.5 44  42 40                10cm   38.5 43  39  36               20cm   33 39.5  33  32               Ankle   29.5 27.5  27  30               Foot   28 27.5  27.5  27               Total                                                          Treatment/Session Summary:    Treatment Assessment:  Patient continues with left ankle swelling in ankle and actually increased in that area. Hyperkeratosis has retained in around bilateral ankles worse in left . Patient would benefit from complex pneumatic pump as as daily compression and short stretch wraps are not enough. .      Communication/Consultation:  None today  Equipment provided today:  none   Recommendations/Intent for next treatment session: Next visit will continue on phase 1 complete decongestive therapy.     Total Treatment Billable Duration: 75 minutes  OT Individual Minutes  Time In: 1934  Time Out: 3452  Minutes: [address] OTR/L, [place]    Post Session Pain  Charge Capture   POC Link  Treatment Note Link  MD Guidelines  MyChart    Future Appointments   Date Time Provider Brooke Acevedo   8/19/2022 11:00 AM RUBEN Swift [facility]   8/23/2022  1:00 PM Stevie Alonso OT SFOST [facility]   9/19/2022  1:20 PM Monik Petty APRN - CNP [address] [facility]   9/20/2022  3:15 PM DELFINO Wheeler CNP [id number] [facility]

## 2022-08-19 ENCOUNTER — APPOINTMENT (OUTPATIENT)
Dept: PHYSICAL THERAPY | Age: 67
End: 2022-08-19
Payer: MEDICARE

## 2022-08-23 ENCOUNTER — HOSPITAL ENCOUNTER (OUTPATIENT)
Dept: PHYSICAL THERAPY | Age: 67
Setting detail: RECURRING SERIES
Discharge: HOME | End: 2022-08-26
Payer: MEDICARE

## 2022-08-23 PROCEDURE — 97140 MANUAL THERAPY 1/> REGIONS: CPT

## 2022-08-23 PROCEDURE — 97535 SELF CARE MNGMENT TRAINING: CPT

## 2022-08-23 ASSESSMENT — PAIN SCALES - GENERAL: PAINLEVEL_OUTOF10: 0

## 2022-08-23 NOTE — PROGRESS NOTES
Wilmar Sal  : 1955  Primary: Medicare Part A And B  Secondary: Franklyn Patel @ [address] [place] [address] [address] [facility] [facility] [address]  Phone: [phone]  Fax: [phone] Plan Frequency: 2x a week    Certification Period Expiration Date: 10/06/22      OT Visit Info: Total # of Visits to Date: 6      OUTPATIENT OCCUPATIONAL THERAPY: Treatment Note 2022  Appt Desk   Episode      Treatment Diagnosis:  See evaluation  Medical/Referring Diagnosis:  Lymphedema, not elsewhere classified [I89.0]  Referring Physician:  DELFINO Lima MD Orders:  OT Eval and Treat   Date of Onset:  Onset Date: 12     Allergies:  Patient has no known allergies. Restrictions/Precautions:    No data recordedNo data recorded  Medications Last Reviewed:  2022     Interventions Planned: (Treatment may consist of any combination of the following)  Current Treatment Recommendations: Strengthening; ROM; Functional mobility training; Self-Care / ADL; Manual Therapy:  MLD; Equipment evaluation, education, & procurement; Patient/Caregiver education & training; Safety education & training     Subjective Comments: Left ankle remains swollen. He arrived wearing compression socks but late due to his transportation. Initial:     0/10 Post Session:     0/10  Medications Last Reviewed:  2022  Updated Objective Findings:  None today  Treatment     MANUAL THERAPY (  30minutes )  MLD bilateral legs.     Manual Lymph Drainage:   Lymph Nodes:    Cervical Supraclavicular Axillary Abdominal Inguinal Popliteal Antecubital   RIGHT [x]     []     []     [x]     [x]     [x]     []       LEFT [x]     []     []     [x]     [x]     [x]     []         Anastamoses:   Axillo-axillary Inguino-inguinal Axillo-inguinal Inguino-axillary   ANTERIOR []     []     []     []       POSTERIOR []     []     []     []       RIGHT []     []     []     []       LEFT []     []     [] []         Limbs:   []    RUE     []    LUE     [x]    RLE    [x]    LLE      SELF CARE: (15 minutes):   Eucerin to bilateral legs and educated on lotion to use at home. 4\" biagrip used on bilateral legs to below knee. Foam added 1/4 grey foam from ankle to knee on right leg only. Right toes were bandaged with mollelast. 3 short stretch bandages used  on each leg this date. Educated to remove if painful, dizzy, or shortness of breath. Lymphedema:  Circumference Measurements (Lower Extremity):                  Date:  7/8/222 Date:   8/16/22 Date: Date: Date:   Location Right Left Right Left Right Left Right Left Right Left   Knee   42.5 44  42 40                10cm   38.5 43  39  36               20cm   33 39.5  33  32               Ankle   29.5 27.5  27  30               Foot   28 27.5  27.5  27               Total                                                          Treatment/Session Summary:    Treatment Assessment:  Progression, no complication. Communication/Consultation:  None today  Equipment provided today:  none   Recommendations/Intent for next treatment session: Next visit will continue on phase 1 complete decongestive therapy.     Total Treatment Billable Duration: 45 minutes  OT Individual Minutes  Time In: 9665  Time Out: 1400  Minutes: [address] [address], R/[place], [place]    Post Session Pain  Charge Capture   POC Link  Treatment Note Link  MD Guidelines  MyChart    Future Appointments   Date Time Provider Brooke Acevedo   9/19/2022  1:20 PM DELFINO Riley CNP [address] [facility]   9/20/2022  3:15 PM DELFINO Wheeler CNP [id number] [facility]

## 2022-09-29 ENCOUNTER — OFFICE VISIT (OUTPATIENT)
Dept: INTERNAL MEDICINE CLINIC | Facility: CLINIC | Age: 67
End: 2022-09-29
Payer: MEDICARE

## 2022-09-29 VITALS
TEMPERATURE: 98.4 F | DIASTOLIC BLOOD PRESSURE: 69 MMHG | RESPIRATION RATE: 15 BRPM | OXYGEN SATURATION: 98 % | SYSTOLIC BLOOD PRESSURE: 112 MMHG | HEART RATE: 84 BPM

## 2022-09-29 DIAGNOSIS — Z12.11 SCREENING FOR COLON CANCER: ICD-10-CM

## 2022-09-29 DIAGNOSIS — I89.0 LYMPHEDEMA OF RIGHT LOWER EXTREMITY: ICD-10-CM

## 2022-09-29 DIAGNOSIS — E78.2 MIXED HYPERLIPIDEMIA: ICD-10-CM

## 2022-09-29 DIAGNOSIS — Z91.81 RISK FOR FALLS: ICD-10-CM

## 2022-09-29 DIAGNOSIS — N18.31 STAGE 3A CHRONIC KIDNEY DISEASE (HCC): ICD-10-CM

## 2022-09-29 DIAGNOSIS — I10 ESSENTIAL HYPERTENSION: Primary | ICD-10-CM

## 2022-09-29 PROCEDURE — 1036F TOBACCO NON-USER: CPT | Performed by: NURSE PRACTITIONER

## 2022-09-29 PROCEDURE — 99214 OFFICE O/P EST MOD 30 MIN: CPT | Performed by: NURSE PRACTITIONER

## 2022-09-29 PROCEDURE — G8427 DOCREV CUR MEDS BY ELIG CLIN: HCPCS | Performed by: NURSE PRACTITIONER

## 2022-09-29 PROCEDURE — 1123F ACP DISCUSS/DSCN MKR DOCD: CPT | Performed by: NURSE PRACTITIONER

## 2022-09-29 PROCEDURE — 3017F COLORECTAL CA SCREEN DOC REV: CPT | Performed by: NURSE PRACTITIONER

## 2022-09-29 PROCEDURE — G8417 CALC BMI ABV UP PARAM F/U: HCPCS | Performed by: NURSE PRACTITIONER

## 2022-09-29 ASSESSMENT — ANXIETY QUESTIONNAIRES
1. FEELING NERVOUS, ANXIOUS, OR ON EDGE: 0
7. FEELING AFRAID AS IF SOMETHING AWFUL MIGHT HAPPEN: 0
5. BEING SO RESTLESS THAT IT IS HARD TO SIT STILL: 0
4. TROUBLE RELAXING: 0
GAD7 TOTAL SCORE: 0
2. NOT BEING ABLE TO STOP OR CONTROL WORRYING: 0
6. BECOMING EASILY ANNOYED OR IRRITABLE: 0
3. WORRYING TOO MUCH ABOUT DIFFERENT THINGS: 0
IF YOU CHECKED OFF ANY PROBLEMS ON THIS QUESTIONNAIRE, HOW DIFFICULT HAVE THESE PROBLEMS MADE IT FOR YOU TO DO YOUR WORK, TAKE CARE OF THINGS AT HOME, OR GET ALONG WITH OTHER PEOPLE: NOT DIFFICULT AT ALL

## 2022-09-29 ASSESSMENT — ENCOUNTER SYMPTOMS
RHINORRHEA: 0
CONSTIPATION: 0
VOMITING: 0
EYE PAIN: 0
SORE THROAT: 0
NAUSEA: 0
DIARRHEA: 0
SHORTNESS OF BREATH: 0
SINUS PAIN: 0
ABDOMINAL PAIN: 0
BACK PAIN: 0
COUGH: 0

## 2022-09-29 ASSESSMENT — PATIENT HEALTH QUESTIONNAIRE - PHQ9
SUM OF ALL RESPONSES TO PHQ QUESTIONS 1-9: 0
SUM OF ALL RESPONSES TO PHQ9 QUESTIONS 1 & 2: 0
2. FEELING DOWN, DEPRESSED OR HOPELESS: 0
SUM OF ALL RESPONSES TO PHQ QUESTIONS 1-9: 0
1. LITTLE INTEREST OR PLEASURE IN DOING THINGS: 0
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
## 2022-09-30 ENCOUNTER — HOME HEALTH ADMISSION (OUTPATIENT)
Dept: HOME HEALTH SERVICES | Facility: HOME HEALTH | Age: 67
End: 2022-09-30

## 2022-10-18 ENCOUNTER — TELEPHONE (OUTPATIENT)
Dept: INTERNAL MEDICINE CLINIC | Facility: CLINIC | Age: 67
End: 2022-10-18

## 2022-10-18 NOTE — TELEPHONE ENCOUNTER
[facility] called requesting verbal orders for patient to skilled PT. Request an call back to [phone].

## 2022-10-19 NOTE — TELEPHONE ENCOUNTER
Called and spoke to [employer] [facility]. Verbal order provided. Will be awaiting faxed plan of care.

## 2022-10-23 LAB — NONINV COLON CA DNA+OCC BLD SCRN STL QL: NEGATIVE

## 2022-11-29 ENCOUNTER — OFFICE VISIT (OUTPATIENT)
Dept: INTERNAL MEDICINE CLINIC | Facility: CLINIC | Age: 67
End: 2022-11-29
Payer: MEDICARE

## 2022-11-29 VITALS
HEIGHT: 70 IN | SYSTOLIC BLOOD PRESSURE: 120 MMHG | TEMPERATURE: 98.1 F | DIASTOLIC BLOOD PRESSURE: 80 MMHG | WEIGHT: 210 LBS | OXYGEN SATURATION: 98 % | HEART RATE: 85 BPM | BODY MASS INDEX: 30.06 KG/M2

## 2022-11-29 DIAGNOSIS — E78.2 MIXED HYPERLIPIDEMIA: ICD-10-CM

## 2022-11-29 DIAGNOSIS — N40.0 BENIGN PROSTATIC HYPERPLASIA WITHOUT LOWER URINARY TRACT SYMPTOMS: ICD-10-CM

## 2022-11-29 DIAGNOSIS — Z00.00 MEDICARE ANNUAL WELLNESS VISIT, SUBSEQUENT: Primary | ICD-10-CM

## 2022-11-29 DIAGNOSIS — I10 ESSENTIAL HYPERTENSION: ICD-10-CM

## 2022-11-29 PROCEDURE — 3074F SYST BP LT 130 MM HG: CPT | Performed by: NURSE PRACTITIONER

## 2022-11-29 PROCEDURE — 1123F ACP DISCUSS/DSCN MKR DOCD: CPT | Performed by: NURSE PRACTITIONER

## 2022-11-29 PROCEDURE — 3078F DIAST BP <80 MM HG: CPT | Performed by: NURSE PRACTITIONER

## 2022-11-29 PROCEDURE — G0439 PPPS, SUBSEQ VISIT: HCPCS | Performed by: NURSE PRACTITIONER

## 2022-11-29 RX ORDER — TAMSULOSIN HYDROCHLORIDE 0.4 MG/1
0.4 CAPSULE ORAL NIGHTLY
Qty: 90 CAPSULE | Refills: 0 | Status: SHIPPED | OUTPATIENT
Start: 2022-11-29

## 2022-11-29 RX ORDER — AMLODIPINE BESYLATE 5 MG/1
5 TABLET ORAL DAILY
Qty: 90 TABLET | Refills: 0 | Status: SHIPPED | OUTPATIENT
Start: 2022-11-29

## 2022-11-29 RX ORDER — ATORVASTATIN CALCIUM 40 MG/1
40 TABLET, FILM COATED ORAL DAILY
Qty: 90 TABLET | Refills: 0 | Status: CANCELLED | OUTPATIENT
Start: 2022-11-29

## 2022-11-29 RX ORDER — ATORVASTATIN CALCIUM 40 MG/1
40 TABLET, FILM COATED ORAL DAILY
Qty: 90 TABLET | Refills: 0 | Status: SHIPPED | OUTPATIENT
Start: 2022-11-29

## 2022-11-29 RX ORDER — AMLODIPINE BESYLATE 5 MG/1
5 TABLET ORAL DAILY
Qty: 90 TABLET | Refills: 0 | Status: CANCELLED | OUTPATIENT
Start: 2022-11-29

## 2022-11-29 ASSESSMENT — ENCOUNTER SYMPTOMS
RHINORRHEA: 0
SORE THROAT: 0
COUGH: 0
NAUSEA: 0
EYE PAIN: 0
CONSTIPATION: 0
ABDOMINAL PAIN: 0
DIARRHEA: 0
SINUS PAIN: 0
SHORTNESS OF BREATH: 0
VOMITING: 0
BACK PAIN: 0

## 2022-11-29 ASSESSMENT — PATIENT HEALTH QUESTIONNAIRE - PHQ9
SUM OF ALL RESPONSES TO PHQ QUESTIONS 1-9: 0
SUM OF ALL RESPONSES TO PHQ QUESTIONS 1-9: 0
1. LITTLE INTEREST OR PLEASURE IN DOING THINGS: 0
SUM OF ALL RESPONSES TO PHQ9 QUESTIONS 1 & 2: 0
2. FEELING DOWN, DEPRESSED OR HOPELESS: 0
SUM OF ALL RESPONSES TO PHQ QUESTIONS 1-9: 0
SUM OF ALL RESPONSES TO PHQ QUESTIONS 1-9: 0

## 2022-11-29 ASSESSMENT — LIFESTYLE VARIABLES
HOW OFTEN DO YOU HAVE A DRINK CONTAINING ALCOHOL: NEVER
HOW MANY STANDARD DRINKS CONTAINING ALCOHOL DO YOU HAVE ON A TYPICAL DAY: PATIENT DOES NOT DRINK
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
## 2022-11-29 NOTE — PATIENT INSTRUCTIONS
Personalized Preventive Plan for Hilaria Lawrence - 11/29/2022  Medicare offers a range of preventive health benefits. Some of the tests and screenings are paid in full while other may be subject to a deductible, co-insurance, and/or copay. Some of these benefits include a comprehensive review of your medical history including lifestyle, illnesses that may run in your family, and various assessments and screenings as appropriate. After reviewing your medical record and screening and assessments performed today your provider may have ordered immunizations, labs, imaging, and/or referrals for you. A list of these orders (if applicable) as well as your Preventive Care list are included within your After Visit Summary for your review. Other Preventive Recommendations:    A preventive eye exam performed by an eye specialist is recommended every 1-2 years to screen for glaucoma; cataracts, macular degeneration, and other eye disorders. A preventive dental visit is recommended every 6 months. Try to get at least 150 minutes of exercise per week or 10,000 steps per day on a pedometer . Order or download the FREE \"Exercise & Physical Activity: Your Everyday Guide\" from [employer]. Call 6-867.657.9516 or search [employer] online. You need 4377-0749 mg of calcium and 7849-0620 IU of vitamin D per day. It is possible to meet your calcium requirement with diet alone, but a vitamin D supplement is usually necessary to meet this goal.  When exposed to the sun, use a sunscreen that protects against both UVA and UVB radiation with an SPF of 30 or greater. Reapply every 2 to 3 hours or after sweating, drying off with a towel, or swimming. Always wear a seat belt when traveling in a car. Always wear a helmet when riding a bicycle or motorcycle.

## 2022-12-05 ENCOUNTER — TELEPHONE (OUTPATIENT)
Dept: INTERNAL MEDICINE CLINIC | Facility: CLINIC | Age: 67
End: 2022-12-05

## 2022-12-05 NOTE — TELEPHONE ENCOUNTER
Magy from [facility] was inquiring about a point of care packet that was given to Era last week concerning the pt. She was wanting to know if the care plan has been signed since home health has been completed and if so if it could be faxed to [phone] or if Magy could be given a call at [phone] and she will come to the office and pick it up.

## 2022-12-13 ENCOUNTER — TELEPHONE (OUTPATIENT)
Dept: INTERNAL MEDICINE CLINIC | Facility: CLINIC | Age: 67
End: 2022-12-13

## 2022-12-14 NOTE — TELEPHONE ENCOUNTER
Attempt to contact patient to discuss which \"eye drop\" the patient is needing, was unsuccessful. Voicemail to return call was made.

## 2022-12-15 NOTE — TELEPHONE ENCOUNTER
Attempt to contact patient to discuss which \"eye drop\" the patient is needing, was unsuccessful. Per PCP patient will need to see eye doctor for refills.

## 2023-01-01 ENCOUNTER — TELEPHONE (OUTPATIENT)
Dept: INTERNAL MEDICINE CLINIC | Facility: CLINIC | Age: 68
End: 2023-01-01

## 2023-01-19 DIAGNOSIS — E78.2 MIXED HYPERLIPIDEMIA: ICD-10-CM

## 2023-01-19 RX ORDER — ATORVASTATIN CALCIUM 40 MG/1
40 TABLET, FILM COATED ORAL DAILY
Qty: 90 TABLET | Refills: 0 | Status: SHIPPED | OUTPATIENT
Start: 2023-01-19

## 2023-02-22 ENCOUNTER — OFFICE VISIT (OUTPATIENT)
Dept: INTERNAL MEDICINE CLINIC | Facility: CLINIC | Age: 68
End: 2023-02-22
Payer: MEDICARE

## 2023-02-22 VITALS
DIASTOLIC BLOOD PRESSURE: 76 MMHG | BODY MASS INDEX: 31.35 KG/M2 | OXYGEN SATURATION: 97 % | SYSTOLIC BLOOD PRESSURE: 122 MMHG | WEIGHT: 219 LBS | TEMPERATURE: 98.6 F | HEIGHT: 70 IN | HEART RATE: 90 BPM

## 2023-02-22 DIAGNOSIS — Z12.5 SCREENING FOR PROSTATE CANCER: ICD-10-CM

## 2023-02-22 DIAGNOSIS — N18.31 STAGE 3A CHRONIC KIDNEY DISEASE (HCC): ICD-10-CM

## 2023-02-22 DIAGNOSIS — E78.2 MIXED HYPERLIPIDEMIA: ICD-10-CM

## 2023-02-22 DIAGNOSIS — I10 ESSENTIAL HYPERTENSION: Primary | ICD-10-CM

## 2023-02-22 DIAGNOSIS — N40.0 BENIGN PROSTATIC HYPERPLASIA WITHOUT LOWER URINARY TRACT SYMPTOMS: ICD-10-CM

## 2023-02-22 DIAGNOSIS — I10 ESSENTIAL HYPERTENSION: ICD-10-CM

## 2023-02-22 LAB
ALBUMIN SERPL-MCNC: 3.4 G/DL (ref 3.2–4.6)
ALBUMIN/GLOB SERPL: 0.8 (ref 0.4–1.6)
ALP SERPL-CCNC: 99 U/L (ref 50–136)
ALT SERPL-CCNC: 26 U/L (ref 12–65)
ANION GAP SERPL CALC-SCNC: 4 MMOL/L (ref 2–11)
AST SERPL-CCNC: 17 U/L (ref 15–37)
BASOPHILS # BLD: 0 K/UL (ref 0–0.2)
BASOPHILS NFR BLD: 1 % (ref 0–2)
BILIRUB SERPL-MCNC: 0.5 MG/DL (ref 0.2–1.1)
BUN SERPL-MCNC: 19 MG/DL (ref 8–23)
CALCIUM SERPL-MCNC: 9.4 MG/DL (ref 8.3–10.4)
CHLORIDE SERPL-SCNC: 108 MMOL/L (ref 101–110)
CHOLEST SERPL-MCNC: 106 MG/DL
CO2 SERPL-SCNC: 28 MMOL/L (ref 21–32)
CREAT SERPL-MCNC: 1.3 MG/DL (ref 0.8–1.5)
DIFFERENTIAL METHOD BLD: ABNORMAL
EOSINOPHIL # BLD: 0.1 K/UL (ref 0–0.8)
EOSINOPHIL NFR BLD: 2 % (ref 0.5–7.8)
ERYTHROCYTE [DISTWIDTH] IN BLOOD BY AUTOMATED COUNT: 13.4 % (ref 11.9–14.6)
GLOBULIN SER CALC-MCNC: 4.3 G/DL (ref 2.8–4.5)
GLUCOSE SERPL-MCNC: 89 MG/DL (ref 65–100)
HCT VFR BLD AUTO: 41.2 % (ref 41.1–50.3)
HDLC SERPL-MCNC: 39 MG/DL (ref 40–60)
HDLC SERPL: 2.7
HGB BLD-MCNC: 13.5 G/DL (ref 13.6–17.2)
IMM GRANULOCYTES # BLD AUTO: 0 K/UL (ref 0–0.5)
IMM GRANULOCYTES NFR BLD AUTO: 0 % (ref 0–5)
LDLC SERPL CALC-MCNC: 49.8 MG/DL
LYMPHOCYTES # BLD: 1.9 K/UL (ref 0.5–4.6)
LYMPHOCYTES NFR BLD: 30 % (ref 13–44)
MCH RBC QN AUTO: 30.3 PG (ref 26.1–32.9)
MCHC RBC AUTO-ENTMCNC: 32.8 G/DL (ref 31.4–35)
MCV RBC AUTO: 92.4 FL (ref 82–102)
MONOCYTES # BLD: 0.5 K/UL (ref 0.1–1.3)
MONOCYTES NFR BLD: 8 % (ref 4–12)
NEUTS SEG # BLD: 3.7 K/UL (ref 1.7–8.2)
NEUTS SEG NFR BLD: 59 % (ref 43–78)
NRBC # BLD: 0 K/UL (ref 0–0.2)
PLATELET # BLD AUTO: 170 K/UL (ref 150–450)
PMV BLD AUTO: 10.9 FL (ref 9.4–12.3)
POTASSIUM SERPL-SCNC: 4 MMOL/L (ref 3.5–5.1)
PROT SERPL-MCNC: 7.7 G/DL (ref 6.3–8.2)
PSA SERPL-MCNC: 5.9 NG/ML
RBC # BLD AUTO: 4.46 M/UL (ref 4.23–5.6)
SODIUM SERPL-SCNC: 140 MMOL/L (ref 133–143)
TRIGL SERPL-MCNC: 86 MG/DL (ref 35–150)
TSH, 3RD GENERATION: 2.38 UIU/ML (ref 0.36–3.74)
VLDLC SERPL CALC-MCNC: 17.2 MG/DL (ref 6–23)
WBC # BLD AUTO: 6.3 K/UL (ref 4.3–11.1)

## 2023-02-22 PROCEDURE — 1123F ACP DISCUSS/DSCN MKR DOCD: CPT | Performed by: NURSE PRACTITIONER

## 2023-02-22 PROCEDURE — 3078F DIAST BP <80 MM HG: CPT | Performed by: NURSE PRACTITIONER

## 2023-02-22 PROCEDURE — 99214 OFFICE O/P EST MOD 30 MIN: CPT | Performed by: NURSE PRACTITIONER

## 2023-02-22 PROCEDURE — 3074F SYST BP LT 130 MM HG: CPT | Performed by: NURSE PRACTITIONER

## 2023-02-22 RX ORDER — TAMSULOSIN HYDROCHLORIDE 0.4 MG/1
0.4 CAPSULE ORAL NIGHTLY
Qty: 90 CAPSULE | Refills: 1 | Status: SHIPPED | OUTPATIENT
Start: 2023-02-22

## 2023-02-22 RX ORDER — AMLODIPINE BESYLATE 5 MG/1
5 TABLET ORAL DAILY
Qty: 90 TABLET | Refills: 1 | Status: SHIPPED | OUTPATIENT
Start: 2023-02-22

## 2023-02-22 RX ORDER — ATORVASTATIN CALCIUM 40 MG/1
40 TABLET, FILM COATED ORAL DAILY
Qty: 90 TABLET | Refills: 1 | Status: SHIPPED | OUTPATIENT
Start: 2023-02-22

## 2023-02-22 SDOH — ECONOMIC STABILITY: INCOME INSECURITY: HOW HARD IS IT FOR YOU TO PAY FOR THE VERY BASICS LIKE FOOD, HOUSING, MEDICAL CARE, AND HEATING?: NOT HARD AT ALL

## 2023-02-22 SDOH — ECONOMIC STABILITY: HOUSING INSECURITY
IN THE LAST 12 MONTHS, WAS THERE A TIME WHEN YOU DID NOT HAVE A STEADY PLACE TO SLEEP OR SLEPT IN A SHELTER (INCLUDING NOW)?: NO

## 2023-02-22 SDOH — ECONOMIC STABILITY: FOOD INSECURITY: WITHIN THE PAST 12 MONTHS, THE FOOD YOU BOUGHT JUST DIDN'T LAST AND YOU DIDN'T HAVE MONEY TO GET MORE.: SOMETIMES TRUE

## 2023-02-22 SDOH — ECONOMIC STABILITY: FOOD INSECURITY: WITHIN THE PAST 12 MONTHS, YOU WORRIED THAT YOUR FOOD WOULD RUN OUT BEFORE YOU GOT MONEY TO BUY MORE.: NEVER TRUE

## 2023-02-22 ASSESSMENT — PATIENT HEALTH QUESTIONNAIRE - PHQ9
SUM OF ALL RESPONSES TO PHQ QUESTIONS 1-9: 0
1. LITTLE INTEREST OR PLEASURE IN DOING THINGS: 0
SUM OF ALL RESPONSES TO PHQ QUESTIONS 1-9: 0
2. FEELING DOWN, DEPRESSED OR HOPELESS: 0
SUM OF ALL RESPONSES TO PHQ QUESTIONS 1-9: 0
SUM OF ALL RESPONSES TO PHQ9 QUESTIONS 1 & 2: 0
SUM OF ALL RESPONSES TO PHQ QUESTIONS 1-9: 0

## 2023-02-22 ASSESSMENT — ENCOUNTER SYMPTOMS
ABDOMINAL PAIN: 0
SHORTNESS OF BREATH: 0
RHINORRHEA: 0
CONSTIPATION: 0
VOMITING: 0
SORE THROAT: 0
COUGH: 0
SINUS PAIN: 0
BACK PAIN: 0
NAUSEA: 0
EYE PAIN: 0
DIARRHEA: 0

## 2023-02-22 ASSESSMENT — ANXIETY QUESTIONNAIRES
5. BEING SO RESTLESS THAT IT IS HARD TO SIT STILL: 0
GAD7 TOTAL SCORE: 0
1. FEELING NERVOUS, ANXIOUS, OR ON EDGE: 0
3. WORRYING TOO MUCH ABOUT DIFFERENT THINGS: 0
4. TROUBLE RELAXING: 0
6. BECOMING EASILY ANNOYED OR IRRITABLE: 0
7. FEELING AFRAID AS IF SOMETHING AWFUL MIGHT HAPPEN: 0
2. NOT BEING ABLE TO STOP OR CONTROL WORRYING: 0

## 2023-02-22 NOTE — PATIENT INSTRUCTIONS
FOOD RESOURCES    Meals on Wheels:  What they offer: Meals on Wheels is a program that delivers meals to individuals who have no reliable means for maintaining a healthy diet. JASMINMSØ Phone: [phone]  www. RAP IndexAlvin J. Siteman Cancer CenterhailyOkeene Municipal [facility] – Okeenelance. [address]:  What they offer:  Anyone is eligible to order, but Lizeth Crawford is specifically designed for customers who could benefit from accessible, low-cost fresh food. Fresh Food Boxes are $15* with credit/debit card and are ALWAYS $5 with SNAP/EBT   Boxes are distributed every other week and you must preorder your box through their website  Drive-thru box pickup is every other Wednesday from 11 am-6 pm at: [address] [facility] Center[facility]  ([facility]) [facility], [address] [facility]  Website:  [email or web address] [email or web address] Medical [email or web address] Klarna/fsg     Food Pantries:   [employer]   Phone: [phone]  Located in [place] [address], [facility].  Open Thursdays 8a-12p  Website: www.timeplazzamarUnite Us [employer]: [phone]  Located at [address] 8am-12pm Fridays  Website: https://LaurelvilleAwesomePieceBath Community HospitalstGall[facility]. org/   [address] Pantry  Phone: [phone]  Located at [address]  Call for Pantry hours and availability  Website: Glow[employer].  Water of [address] [place] [address] Pantry  Phone: [phone]  Located at [phone]  Call for Pantry hours and availability    Website: Proteon Therapeuticsgoldy.pl. php  [address]  Phone: [phone]  Located at [facility]. Emergency Food Pantry Hours: Mon, Wed, and Fri 9am-1pm  Website: http://www.Suvaco/  [address] Pantry  Phone: [phone]  Located at [address] [facility].   Call for hours and availability   Website: https://Spling/  [address] Pantry  Phone: [phone]  Located at [address] [place] [address] Brian.  Call for hours and availability; serves up to 76 families a week, based on donations  Website: https://Passport Systemsl. cc/      Need additional resources? Call 211 or Find Help: https://www. Netseer.org/TRANSPORTATION RESOURCES    Medicaid Van: Nallely Aldana. NEW NAME - [employer]   Phone:  [phone]  Must call for a ride at least 3 days before your appointment. Call Monday- Friday 8:00am to 5:00pm.    Transportation is available for MD appts, dialysis, x-rays, lab work, drug store, or other medical appointments. [employer] on Aging  What they offer: Provides assistance and medical transport to adults 60 years and older. Phone: [phone]    Red Dot Payment   What they offer: Charge a fee for transport (not free)  Phone: [phone]    Transportation on Demand   What they offer: Charge a fee for transport (not free)  Phone: 21 [phone] they offer: Bishop [place] is accessible via an online platform that connects patients with non-emergency medical transportation (NEMT.) Roundtrip is a comprehensive solution which supports all levels of transport: rideshare (Lyft/Uber), Taxis, wheelchair vans, stretcher vehicles, ALS/BLS, and all payors when and where they are needed. https://[employer]/      Need additional resources? Call 211 or visit Find Help:  https://www. Netseer.org/
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
## 2023-02-23 DIAGNOSIS — R97.20 BPH WITH ELEVATED PSA: Primary | ICD-10-CM

## 2023-02-23 DIAGNOSIS — D64.9 ANEMIA, UNSPECIFIED TYPE: Primary | ICD-10-CM

## 2023-02-23 DIAGNOSIS — N40.0 BPH WITH ELEVATED PSA: Primary | ICD-10-CM

## 2023-02-23 RX ORDER — FERROUS SULFATE 325(65) MG
325 TABLET ORAL
Qty: 90 TABLET | Refills: 1 | Status: SHIPPED | OUTPATIENT
Start: 2023-02-23

## 2023-02-23 RX ORDER — DOCUSATE SODIUM 100 MG/1
100 CAPSULE, LIQUID FILLED ORAL DAILY
Qty: 90 CAPSULE | Refills: 1 | Status: SHIPPED | OUTPATIENT
Start: 2023-02-23

## 2023-04-06 ENCOUNTER — TELEPHONE (OUTPATIENT)
Dept: UROLOGY | Age: 68
End: 2023-04-06

## 2023-04-19 ENCOUNTER — OFFICE VISIT (OUTPATIENT)
Dept: UROLOGY | Age: 68
End: 2023-04-19
Payer: MEDICARE

## 2023-04-19 DIAGNOSIS — R97.20 ELEVATED PSA: Primary | ICD-10-CM

## 2023-04-19 DIAGNOSIS — N40.1 BPH WITH OBSTRUCTION/LOWER URINARY TRACT SYMPTOMS: ICD-10-CM

## 2023-04-19 DIAGNOSIS — N30.00 ACUTE CYSTITIS WITHOUT HEMATURIA: ICD-10-CM

## 2023-04-19 DIAGNOSIS — N13.8 BPH WITH OBSTRUCTION/LOWER URINARY TRACT SYMPTOMS: ICD-10-CM

## 2023-04-19 LAB
BILIRUBIN, URINE, POC: NEGATIVE
BLOOD URINE, POC: NORMAL
GLUCOSE URINE, POC: NEGATIVE
KETONES, URINE, POC: NEGATIVE
LEUKOCYTE ESTERASE, URINE, POC: NORMAL
NITRITE, URINE, POC: NEGATIVE
PH, URINE, POC: 6 (ref 4.6–8)
PROTEIN,URINE, POC: 300
SPECIFIC GRAVITY, URINE, POC: 1.03 (ref 1–1.03)
URINALYSIS CLARITY, POC: NORMAL
URINALYSIS COLOR, POC: NORMAL
UROBILINOGEN, POC: NORMAL

## 2023-04-19 PROCEDURE — 1123F ACP DISCUSS/DSCN MKR DOCD: CPT | Performed by: NURSE PRACTITIONER

## 2023-04-19 PROCEDURE — 81003 URINALYSIS AUTO W/O SCOPE: CPT | Performed by: NURSE PRACTITIONER

## 2023-04-19 PROCEDURE — 99214 OFFICE O/P EST MOD 30 MIN: CPT | Performed by: NURSE PRACTITIONER

## 2023-04-19 ASSESSMENT — ENCOUNTER SYMPTOMS
NAUSEA: 0
BACK PAIN: 0

## 2023-04-19 NOTE — PROGRESS NOTES
breakfast) 90 tablet 1    Ascorbic Acid 500 MG CHEW Take 1 tablet by mouth daily 90 tablet 1    docusate sodium (COLACE) 100 MG capsule Take 1 capsule by mouth daily 90 capsule 1    tamsulosin (FLOMAX) 0.4 MG capsule Take 1 capsule by mouth at bedtime 90 capsule 1    atorvastatin (LIPITOR) 40 MG tablet Take 1 tablet by mouth daily 90 tablet 1    amLODIPine (NORVASC) 5 MG tablet Take 1 tablet by mouth daily 90 tablet 1    bimatoprost (LUMIGAN) 0.01 % SOLN ophthalmic drops Apply 1 drop to eye every evening      dorzolamide-timolol (COSOPT) 22.3-6.8 MG/ML ophthalmic solution INSTILL 1 DROP INTO BOTH EYES TWICE A DAY      brimonidine-timolol (COMBIGAN) 0.2-0.5 % ophthalmic solution Apply 1 drop to eye 2 times daily (Patient not taking: Reported on 2023)       No current facility-administered medications for this visit. No Known Allergies  Social History     Socioeconomic History    Marital status: Single     Spouse name: Not on file    Number of children: Not on file    Years of education: Not on file    Highest education level: Not on file   Occupational History    Not on file   Tobacco Use    Smoking status: Former     Packs/day: 1.00     Types: Cigarettes     Quit date: 1980     Years since quittin.3    Smokeless tobacco: Never   Vaping Use    Vaping Use: Never used   Substance and Sexual Activity    Alcohol use: No     Alcohol/week: 0.0 standard drinks    Drug use: No    Sexual activity: Not on file   Other Topics Concern    Not on file   Social History Narrative    He has a mother that lives nearby as well as a brother and sister.       Social Determinants of Health     Financial Resource Strain: Low Risk     Difficulty of Paying Living Expenses: Not hard at all   Food Insecurity: Food Insecurity Present    Worried About [employer] in the Last Year: Never true    Gerry of Food in the Last Year: Sometimes true   Transportation Needs: Unmet Transportation Needs    Lack of Transportation

## 2023-04-22 LAB
BACTERIA SPEC CULT: ABNORMAL
SERVICE CMNT-IMP: ABNORMAL

## 2023-06-26 ENCOUNTER — TELEPHONE (OUTPATIENT)
Dept: INTERNAL MEDICINE CLINIC | Facility: CLINIC | Age: 68
End: 2023-06-26

## 2023-07-03 ENCOUNTER — TELEPHONE (OUTPATIENT)
Dept: INTERNAL MEDICINE CLINIC | Facility: CLINIC | Age: 68
End: 2023-07-03

## 2023-07-03 NOTE — TELEPHONE ENCOUNTER
Pt called requesting medication for ED, informed him that would be a new prescription and requires an appt.  Gave 7/5/23 at 2 pm.

## 2023-07-05 ENCOUNTER — TELEPHONE (OUTPATIENT)
Dept: INTERNAL MEDICINE CLINIC | Facility: CLINIC | Age: 68
End: 2023-07-05

## 2023-07-05 NOTE — TELEPHONE ENCOUNTER
Per Rodrigo Hernandez NP, Mr Marty Brink will be best served by seeing his urologist at [facility] for his ED problem. There is no need for him to take the bus to the office today. Mr Marty Brink will call urology to schedule an appointment.

## 2023-07-17 ENCOUNTER — TELEPHONE (OUTPATIENT)
Dept: UROLOGY | Age: 68
End: 2023-07-17

## 2023-08-23 ENCOUNTER — OFFICE VISIT (OUTPATIENT)
Dept: INTERNAL MEDICINE CLINIC | Facility: CLINIC | Age: 68
End: 2023-08-23
Payer: MEDICARE

## 2023-08-23 VITALS
SYSTOLIC BLOOD PRESSURE: 128 MMHG | TEMPERATURE: 98.3 F | DIASTOLIC BLOOD PRESSURE: 81 MMHG | WEIGHT: 225.3 LBS | BODY MASS INDEX: 32.25 KG/M2 | HEIGHT: 70 IN | OXYGEN SATURATION: 98 % | HEART RATE: 79 BPM

## 2023-08-23 DIAGNOSIS — D64.9 ANEMIA, UNSPECIFIED TYPE: ICD-10-CM

## 2023-08-23 DIAGNOSIS — N18.31 STAGE 3A CHRONIC KIDNEY DISEASE (HCC): ICD-10-CM

## 2023-08-23 DIAGNOSIS — I10 ESSENTIAL HYPERTENSION: Primary | ICD-10-CM

## 2023-08-23 DIAGNOSIS — N40.0 BENIGN PROSTATIC HYPERPLASIA WITHOUT LOWER URINARY TRACT SYMPTOMS: ICD-10-CM

## 2023-08-23 DIAGNOSIS — Z91.81 RISK FOR FALLS: ICD-10-CM

## 2023-08-23 DIAGNOSIS — R97.20 ELEVATED PSA: ICD-10-CM

## 2023-08-23 DIAGNOSIS — I89.0 LYMPHEDEMA, NOT ELSEWHERE CLASSIFIED: ICD-10-CM

## 2023-08-23 DIAGNOSIS — E78.2 MIXED HYPERLIPIDEMIA: ICD-10-CM

## 2023-08-23 DIAGNOSIS — I10 ESSENTIAL HYPERTENSION: ICD-10-CM

## 2023-08-23 LAB
ALBUMIN SERPL-MCNC: 3.5 G/DL (ref 3.2–4.6)
ALBUMIN/GLOB SERPL: 0.8 (ref 0.4–1.6)
ALP SERPL-CCNC: 100 U/L (ref 50–136)
ALT SERPL-CCNC: 21 U/L (ref 12–65)
ANION GAP SERPL CALC-SCNC: 5 MMOL/L (ref 2–11)
AST SERPL-CCNC: 14 U/L (ref 15–37)
BASOPHILS # BLD: 0 K/UL (ref 0–0.2)
BASOPHILS NFR BLD: 0 % (ref 0–2)
BILIRUB SERPL-MCNC: 0.4 MG/DL (ref 0.2–1.1)
BUN SERPL-MCNC: 23 MG/DL (ref 8–23)
CALCIUM SERPL-MCNC: 9.4 MG/DL (ref 8.3–10.4)
CHLORIDE SERPL-SCNC: 111 MMOL/L (ref 101–110)
CO2 SERPL-SCNC: 26 MMOL/L (ref 21–32)
CREAT SERPL-MCNC: 1.4 MG/DL (ref 0.8–1.5)
DIFFERENTIAL METHOD BLD: NORMAL
EOSINOPHIL # BLD: 0.1 K/UL (ref 0–0.8)
EOSINOPHIL NFR BLD: 2 % (ref 0.5–7.8)
ERYTHROCYTE [DISTWIDTH] IN BLOOD BY AUTOMATED COUNT: 13.4 % (ref 11.9–14.6)
GLOBULIN SER CALC-MCNC: 4.2 G/DL (ref 2.8–4.5)
GLUCOSE SERPL-MCNC: 82 MG/DL (ref 65–100)
HCT VFR BLD AUTO: 41.7 % (ref 41.1–50.3)
HGB BLD-MCNC: 13.6 G/DL (ref 13.6–17.2)
IMM GRANULOCYTES # BLD AUTO: 0 K/UL (ref 0–0.5)
IMM GRANULOCYTES NFR BLD AUTO: 0 % (ref 0–5)
LYMPHOCYTES # BLD: 2.6 K/UL (ref 0.5–4.6)
LYMPHOCYTES NFR BLD: 32 % (ref 13–44)
MCH RBC QN AUTO: 30.5 PG (ref 26.1–32.9)
MCHC RBC AUTO-ENTMCNC: 32.6 G/DL (ref 31.4–35)
MCV RBC AUTO: 93.5 FL (ref 82–102)
MONOCYTES # BLD: 0.5 K/UL (ref 0.1–1.3)
MONOCYTES NFR BLD: 6 % (ref 4–12)
NEUTS SEG # BLD: 4.9 K/UL (ref 1.7–8.2)
NEUTS SEG NFR BLD: 60 % (ref 43–78)
NRBC # BLD: 0 K/UL (ref 0–0.2)
PLATELET # BLD AUTO: 174 K/UL (ref 150–450)
PMV BLD AUTO: 11.1 FL (ref 9.4–12.3)
POTASSIUM SERPL-SCNC: 4.2 MMOL/L (ref 3.5–5.1)
PROT SERPL-MCNC: 7.7 G/DL (ref 6.3–8.2)
RBC # BLD AUTO: 4.46 M/UL (ref 4.23–5.6)
SODIUM SERPL-SCNC: 142 MMOL/L (ref 133–143)
WBC # BLD AUTO: 8.1 K/UL (ref 4.3–11.1)

## 2023-08-23 PROCEDURE — 3079F DIAST BP 80-89 MM HG: CPT | Performed by: NURSE PRACTITIONER

## 2023-08-23 PROCEDURE — 1123F ACP DISCUSS/DSCN MKR DOCD: CPT | Performed by: NURSE PRACTITIONER

## 2023-08-23 PROCEDURE — 99214 OFFICE O/P EST MOD 30 MIN: CPT | Performed by: NURSE PRACTITIONER

## 2023-08-23 PROCEDURE — 3074F SYST BP LT 130 MM HG: CPT | Performed by: NURSE PRACTITIONER

## 2023-08-23 RX ORDER — DOCUSATE SODIUM 100 MG/1
100 CAPSULE, LIQUID FILLED ORAL DAILY
Qty: 90 CAPSULE | Refills: 1 | Status: SHIPPED | OUTPATIENT
Start: 2023-08-23

## 2023-08-23 RX ORDER — ATORVASTATIN CALCIUM 40 MG/1
40 TABLET, FILM COATED ORAL DAILY
Qty: 120 TABLET | Refills: 1 | Status: SHIPPED | OUTPATIENT
Start: 2023-08-23

## 2023-08-23 RX ORDER — FERROUS SULFATE 325(65) MG
325 TABLET ORAL
Qty: 90 TABLET | Refills: 1 | Status: SHIPPED | OUTPATIENT
Start: 2023-08-23

## 2023-08-23 RX ORDER — AMLODIPINE BESYLATE 5 MG/1
5 TABLET ORAL DAILY
Qty: 120 TABLET | Refills: 1 | Status: SHIPPED | OUTPATIENT
Start: 2023-08-23

## 2023-08-23 RX ORDER — TAMSULOSIN HYDROCHLORIDE 0.4 MG/1
0.4 CAPSULE ORAL NIGHTLY
Qty: 90 CAPSULE | Refills: 1 | Status: SHIPPED | OUTPATIENT
Start: 2023-08-23

## 2023-08-23 ASSESSMENT — ENCOUNTER SYMPTOMS
BACK PAIN: 0
SHORTNESS OF BREATH: 0
NAUSEA: 0
ABDOMINAL PAIN: 0
SORE THROAT: 0
VOMITING: 0
SINUS PAIN: 0
COUGH: 0
CONSTIPATION: 0
EYE PAIN: 0
DIARRHEA: 0
RHINORRHEA: 0

## 2023-08-23 NOTE — PROGRESS NOTES
09/30/2020     Lab Results   Component Value Date    CHOLHDLRATIO 2.7 02/22/2023    CHOLHDLRATIO 4.6 06/20/2022            CKD      Lab Results   Component Value Date     02/22/2023    K 4.0 02/22/2023     02/22/2023    CO2 28 02/22/2023    BUN 19 02/22/2023    CREATININE 1.30 02/22/2023    GLUCOSE 89 02/22/2023    CALCIUM 9.4 02/22/2023    PROT 7.7 02/22/2023    LABALBU 3.4 02/22/2023    BILITOT 0.5 02/22/2023    ALKPHOS 99 02/22/2023    AST 17 02/22/2023    ALT 26 02/22/2023    LABGLOM 59 (L) 02/22/2023    GFRAA >60 06/20/2022    AGRATIO 1.2 05/06/2021    GLOB 4.3 02/22/2023          Anemia    Lab Results   Component Value Date    WBC 6.3 02/22/2023    HGB 13.5 (L) 02/22/2023    HCT 41.2 02/22/2023    MCV 92.4 02/22/2023     02/22/2023          BPH  Elevated PSA  Seen at urology office 4/2023  Nocturia: 3x, still taking tamsulosin. Pt with no show at urology office 7/5/2023; states he got a new phone # last month.     Lab Results   Component Value Date    PSA 5.9 (H) 02/22/2023    PSA 3.8 09/30/2020            HCM      Eye exam: [facility], next appt 10/1/23 per pt  Dental: due  Cologuard 10/2022 negative    Immunization History   Administered Date(s) Administered    COVID-19, MODERNA Bivalent, (age 12y+), IM, 48 mcg/0.5 mL 12/01/2022    COVID-19, PFIZER PURPLE top, DILUTE for use, (age 15 y+), 30mcg/0.3mL 01/19/2021, 02/12/2021    Influenza Trivalent 11/10/2015, 09/06/2016    Influenza, FLUAD, (age 72 y+), Adjuvanted, 0.5mL 09/30/2020    Influenza, FLUCELVAX, (age 10 mo+), MDCK, PF, 0.5mL 10/11/2017    Influenza, High Dose (Fluzone 65 yrs and older) 10/24/2019    PPD Test 01/13/2015    Pneumococcal, PCV-13, PREVNAR 13, (age 6w+), IM, 0.5mL 09/24/2019    Pneumococcal, PPSV23, PNEUMOVAX 23, (age 2y+), SC/IM, 0.5mL 05/07/2015    TDaP, ADACEL (age 6y-58y), BOOSTRIX (age 10y+), IM, 0.5mL 05/12/2015    Zoster Live (Zostavax) 05/12/2015          Social History     Substance and Sexual Activity

## 2023-08-23 NOTE — PATIENT INSTRUCTIONS
Please arrive 20 minutes prior to your appointment time to allow time for checking in at the  and rooming with the medical assistant. Please bring your medication bottles at each visit.        [address], [address] [place] [address]   [phone]

## 2023-08-25 LAB
PSA FREE MFR SERPL: 36.1 %
PSA FREE SERPL-MCNC: 2.6 NG/ML
PSA SERPL-MCNC: 7.2 NG/ML

## 2023-08-31 ENCOUNTER — OFFICE VISIT (OUTPATIENT)
Dept: UROLOGY | Age: 68
End: 2023-08-31
Payer: MEDICARE

## 2023-08-31 DIAGNOSIS — R97.20 ELEVATED PSA: Primary | ICD-10-CM

## 2023-08-31 DIAGNOSIS — N40.1 BPH WITH OBSTRUCTION/LOWER URINARY TRACT SYMPTOMS: ICD-10-CM

## 2023-08-31 DIAGNOSIS — N13.8 BPH WITH OBSTRUCTION/LOWER URINARY TRACT SYMPTOMS: ICD-10-CM

## 2023-08-31 DIAGNOSIS — N39.0 RECURRENT UTI: ICD-10-CM

## 2023-08-31 LAB — PVR, POC: 401 CC

## 2023-08-31 PROCEDURE — 51798 US URINE CAPACITY MEASURE: CPT | Performed by: NURSE PRACTITIONER

## 2023-08-31 PROCEDURE — 1123F ACP DISCUSS/DSCN MKR DOCD: CPT | Performed by: NURSE PRACTITIONER

## 2023-08-31 PROCEDURE — 99214 OFFICE O/P EST MOD 30 MIN: CPT | Performed by: NURSE PRACTITIONER

## 2023-08-31 RX ORDER — AMOXICILLIN 500 MG/1
500 CAPSULE ORAL 2 TIMES DAILY
Qty: 14 CAPSULE | Refills: 0 | Status: SHIPPED | OUTPATIENT
Start: 2023-08-31 | End: 2023-09-07

## 2023-08-31 ASSESSMENT — ENCOUNTER SYMPTOMS
NAUSEA: 0
BACK PAIN: 0

## 2023-08-31 NOTE — PROGRESS NOTES
Days of Exercise per Week: 0 days    Minutes of Exercise per Session: 0 min   Stress: Not on file   Social Connections: Not on file   Intimate Partner Violence: Not on file   Housing Stability: Unknown    Unable to Pay for Housing in the Last Year: Not on file    Number of [address] in the Last Year: Not on file    Unstable Housing in the Last Year: No     Family History   Problem Relation Age of Onset    Asthma Father         emphysema       Review of Systems  Constitutional:   Negative for fever. GI:  Negative for nausea. Musculoskeletal:  Negative for back pain. PHYSICAL EXAM    General appearance - well appearing and in no distress  Mental status - alert, oriented to person, place, and time  Neck - supple, no significant adenopathy  Chest/Lung-  Quiet, even and easy respiratory effort without use of accessory muscles  Skin - normal coloration and turgor, no rashes      Assessment and Plan    ICD-10-CM    1. Elevated PSA  R97.20 AMB POC URINALYSIS DIP STICK AUTO W/O MICRO     AMB POC PVR, EBKA,POST-VOID RES,US,NON-IMAGING      2. BPH with obstruction/lower urinary tract symptoms  N40.1 AMB POC URINALYSIS DIP STICK AUTO W/O MICRO    N13.8 AMB POC PVR, BEKA,POST-VOID RES,US,NON-IMAGING      3. Recurrent UTI  N39.0 Culture, Urine          Recurrent UTI- urine infected. Culture pending. Start Amoxil 500 mg PO BID x 7 days. Patient will begin daily suppressive antibiotic. Potential side effects were discussed. The plan will be to hopefully be able to wean this in 3-4 months to 3 times weekly and eventually stop it altogether. BPH- PVR remains sign elevated. Cont Flomax 0.4 mg PO daily. Will RTO for cysto w next available MD. Likely needs TURP. Elev PSA- PSA trend reviewed. PSA likely elevated d/t BPH, elevated PVR, and recurrent UTI. HOLD on add imaging (MRI) at this time. Will cont to follow. Advised to call sooner if needed.     Emily Yanes is supervising physician

## 2023-09-02 LAB
BACTERIA SPEC CULT: ABNORMAL
SERVICE CMNT-IMP: ABNORMAL

## 2023-09-22 ENCOUNTER — TELEPHONE (OUTPATIENT)
Dept: UROLOGY | Age: 68
End: 2023-09-22

## 2023-09-22 ENCOUNTER — PROCEDURE VISIT (OUTPATIENT)
Dept: UROLOGY | Age: 68
End: 2023-09-22

## 2023-09-22 DIAGNOSIS — N30.00 ACUTE CYSTITIS WITHOUT HEMATURIA: ICD-10-CM

## 2023-09-22 DIAGNOSIS — N39.0 RECURRENT UTI: ICD-10-CM

## 2023-09-22 DIAGNOSIS — N40.1 BPH WITH OBSTRUCTION/LOWER URINARY TRACT SYMPTOMS: Primary | ICD-10-CM

## 2023-09-22 DIAGNOSIS — N13.8 BPH WITH OBSTRUCTION/LOWER URINARY TRACT SYMPTOMS: Primary | ICD-10-CM

## 2023-09-22 DIAGNOSIS — R97.20 ELEVATED PSA: ICD-10-CM

## 2023-09-22 DIAGNOSIS — N13.8 BPH WITH OBSTRUCTION/LOWER URINARY TRACT SYMPTOMS: ICD-10-CM

## 2023-09-22 DIAGNOSIS — N40.1 BPH WITH OBSTRUCTION/LOWER URINARY TRACT SYMPTOMS: ICD-10-CM

## 2023-09-22 RX ORDER — CEFTRIAXONE 1 G/1
1000 INJECTION, POWDER, FOR SOLUTION INTRAMUSCULAR; INTRAVENOUS EVERY 24 HOURS
Status: SHIPPED | OUTPATIENT
Start: 2023-09-22

## 2023-09-22 RX ORDER — SULFAMETHOXAZOLE AND TRIMETHOPRIM 800; 160 MG/1; MG/1
1 TABLET ORAL 2 TIMES DAILY
Qty: 14 TABLET | Refills: 0 | Status: SHIPPED | OUTPATIENT
Start: 2023-09-22 | End: 2023-09-29

## 2023-09-22 RX ORDER — FINASTERIDE 5 MG/1
5 TABLET, FILM COATED ORAL DAILY
Qty: 90 TABLET | Refills: 3 | Status: SHIPPED | OUTPATIENT
Start: 2023-09-22

## 2023-09-22 RX ADMIN — CEFTRIAXONE 1000 MG: 1 INJECTION, POWDER, FOR SOLUTION INTRAMUSCULAR; INTRAVENOUS at 10:13

## 2023-09-22 NOTE — PROGRESS NOTES
hard at all   Food Insecurity: Food Insecurity Present (2/22/2023)    Hunger Vital Sign     Worried About Running Out of Food in the Last Year: Never true     Ran Out of Food in the Last Year: Sometimes true   Transportation Needs: Unmet Transportation Needs (2/22/2023)    PRAPARE - Transportation     Lack of Transportation (Medical): Not on file     Lack of Transportation (Non-Medical): Yes   Physical Activity: Inactive (11/29/2022)    Exercise Vital Sign     Days of Exercise per Week: 0 days     Minutes of Exercise per Session: 0 min   Stress: Not on file   Social Connections: Not on file   Intimate Partner Violence: Not on file   Housing Stability: Unknown (2/22/2023)    Housing Stability Vital Sign     Unable to Pay for Housing in the Last Year: Not on file     Number of [address] in the Last Year: Not on file     Unstable Housing in the Last Year: No     Family History   Problem Relation Age of Onset    Asthma Father         emphysema       There were no vitals taken for this visit. UA - Dipstick      UA - Micro  WBC - 0  RBC - 0  Bacteria - 0  Epith - 0    There were no vitals taken for this visit. GENERAL: No acute distress, Awake, Alert, Oriented X 3, Gait normal  CARDIAC: regular rate and rhythm  CHEST AND LUNG: Easy work of breathing, clear to auscultation bilaterally, no cyanosis  ABDOMEN: soft, non tender, non-distended, positive bowel sounds, no organomegaly, no palpable masses, no guarding, no rebound tenderness  SKIN: No rash, no erythema, no lacerations or abrasions, no ecchymosis  NEUROLOGIC: cranial nerves 2-12 grossly intact         Cystoscopy Procedure:     Procedure Room # 1  Scope ID: dispos  Assistant: AL      All risks, benefits and alternatives were again reviewed with patient and he is willing to proceed at this time. His genital area was prepped and draped and a sterile field applied. 2% lidocaine jelly was injected in the the urethra and allowed to dwell for several minutes.   A

## 2023-09-22 NOTE — TELEPHONE ENCOUNTER
----- Message from Damián Ramirez MD sent at 2023 10:39 AM EDT -----  [address]: [facility]    Surgeon: Dg Mercado    Assist: NONE    Diagnosis: BPH/LUTS    Procedure: Cystoscopy, bipolar TURP    Posting time: 90 minutes    Special Instruments Needed:  NONE    Anesthesia: GENERAL    Labs: CBC, BMP, U/A    Tests: EKG per anesthesia    Blood: NONE    Bowel Prep: NONE    Special Instructions:     Orders/HandP:     Follow up appointment: TBD

## 2023-09-24 LAB
BACTERIA SPEC CULT: ABNORMAL
SERVICE CMNT-IMP: ABNORMAL

## 2023-09-25 DIAGNOSIS — N30.00 ACUTE CYSTITIS WITHOUT HEMATURIA: Primary | ICD-10-CM

## 2023-09-25 LAB
BACTERIA SPEC CULT: ABNORMAL
BACTERIA SPEC CULT: ABNORMAL
SERVICE CMNT-IMP: ABNORMAL

## 2023-09-25 RX ORDER — AMPICILLIN 500 MG/1
500 CAPSULE ORAL 4 TIMES DAILY
Qty: 40 CAPSULE | Refills: 0 | Status: SHIPPED | OUTPATIENT
Start: 2023-09-25 | End: 2023-10-05

## 2023-09-29 PROBLEM — N13.8 BPH WITH OBSTRUCTION/LOWER URINARY TRACT SYMPTOMS: Status: ACTIVE | Noted: 2023-09-22

## 2023-09-29 PROBLEM — N40.1 BPH WITH OBSTRUCTION/LOWER URINARY TRACT SYMPTOMS: Status: ACTIVE | Noted: 2023-09-22

## 2023-09-29 NOTE — TELEPHONE ENCOUNTER
Called and MM for pt with date of surgery. Request sent to scheduling for Jos 11/14/23 with PAT 11/7/23.

## 2023-09-29 NOTE — TELEPHONE ENCOUNTER
Procedures: Procedure(s):   CYSTOSCOPY, BIPOLAR TRANSURETHRAL RESECTION PROSTATE   Date: 11/14/2023   Time: 1515   Location: [facility] MAIN OR  CYSTO     Scheduled. Please complete orders.

## 2023-10-20 ENCOUNTER — TELEPHONE (OUTPATIENT)
Dept: UROLOGY | Age: 68
End: 2023-10-20

## 2023-10-20 DIAGNOSIS — N40.1 BPH WITH OBSTRUCTION/LOWER URINARY TRACT SYMPTOMS: Primary | ICD-10-CM

## 2023-10-20 DIAGNOSIS — N13.8 BPH WITH OBSTRUCTION/LOWER URINARY TRACT SYMPTOMS: Primary | ICD-10-CM

## 2023-10-20 NOTE — TELEPHONE ENCOUNTER
Referral placed to [facility] u[facility] per patient request.     Prasanna Doe. Winston Person M.D.     [facility]  [facility], [address] [place] [address]  Phone: [phone]  Fax: [phone]
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
